# Patient Record
Sex: FEMALE | Race: ASIAN | NOT HISPANIC OR LATINO | URBAN - NONMETROPOLITAN AREA
[De-identification: names, ages, dates, MRNs, and addresses within clinical notes are randomized per-mention and may not be internally consistent; named-entity substitution may affect disease eponyms.]

---

## 2018-01-18 ENCOUNTER — CONVERSION ENCOUNTER (OUTPATIENT)
Dept: FAMILY MEDICINE CLINIC | Age: 83
End: 2018-01-18

## 2018-02-01 ENCOUNTER — CONVERSION ENCOUNTER (OUTPATIENT)
Dept: FAMILY MEDICINE CLINIC | Age: 83
End: 2018-02-01

## 2018-02-22 ENCOUNTER — CONVERSION ENCOUNTER (OUTPATIENT)
Dept: FAMILY MEDICINE CLINIC | Age: 83
End: 2018-02-22

## 2018-03-27 ENCOUNTER — CONVERSION ENCOUNTER (OUTPATIENT)
Dept: FAMILY MEDICINE CLINIC | Age: 83
End: 2018-03-27

## 2018-04-05 ENCOUNTER — CONVERSION ENCOUNTER (OUTPATIENT)
Dept: FAMILY MEDICINE CLINIC | Age: 83
End: 2018-04-05

## 2018-04-17 ENCOUNTER — CONVERSION ENCOUNTER (OUTPATIENT)
Dept: FAMILY MEDICINE CLINIC | Age: 83
End: 2018-04-17

## 2018-05-03 ENCOUNTER — CONVERSION ENCOUNTER (OUTPATIENT)
Dept: FAMILY MEDICINE CLINIC | Age: 83
End: 2018-05-03

## 2018-05-25 ENCOUNTER — CONVERSION ENCOUNTER (OUTPATIENT)
Dept: FAMILY MEDICINE CLINIC | Age: 83
End: 2018-05-25

## 2018-07-30 ENCOUNTER — CONVERSION ENCOUNTER (OUTPATIENT)
Dept: FAMILY MEDICINE CLINIC | Age: 83
End: 2018-07-30

## 2018-08-20 ENCOUNTER — OFFICE VISIT CONVERTED (OUTPATIENT)
Dept: FAMILY MEDICINE CLINIC | Age: 83
End: 2018-08-20
Attending: FAMILY MEDICINE

## 2018-08-27 ENCOUNTER — CONVERSION ENCOUNTER (OUTPATIENT)
Dept: FAMILY MEDICINE CLINIC | Age: 83
End: 2018-08-27

## 2018-09-27 ENCOUNTER — CONVERSION ENCOUNTER (OUTPATIENT)
Dept: FAMILY MEDICINE CLINIC | Age: 83
End: 2018-09-27

## 2018-10-31 ENCOUNTER — OFFICE VISIT CONVERTED (OUTPATIENT)
Dept: FAMILY MEDICINE CLINIC | Age: 83
End: 2018-10-31
Attending: NURSE PRACTITIONER

## 2018-11-13 ENCOUNTER — CONVERSION ENCOUNTER (OUTPATIENT)
Dept: FAMILY MEDICINE CLINIC | Age: 83
End: 2018-11-13

## 2018-11-30 ENCOUNTER — OFFICE VISIT CONVERTED (OUTPATIENT)
Dept: FAMILY MEDICINE CLINIC | Age: 83
End: 2018-11-30
Attending: FAMILY MEDICINE

## 2018-12-11 ENCOUNTER — OFFICE VISIT CONVERTED (OUTPATIENT)
Dept: FAMILY MEDICINE CLINIC | Age: 83
End: 2018-12-11
Attending: NURSE PRACTITIONER

## 2018-12-14 ENCOUNTER — CONVERSION ENCOUNTER (OUTPATIENT)
Dept: FAMILY MEDICINE CLINIC | Age: 83
End: 2018-12-14

## 2018-12-18 ENCOUNTER — OFFICE VISIT CONVERTED (OUTPATIENT)
Dept: FAMILY MEDICINE CLINIC | Age: 83
End: 2018-12-18
Attending: NURSE PRACTITIONER

## 2019-01-07 ENCOUNTER — OFFICE VISIT CONVERTED (OUTPATIENT)
Dept: FAMILY MEDICINE CLINIC | Age: 84
End: 2019-01-07
Attending: FAMILY MEDICINE

## 2019-01-14 ENCOUNTER — CONVERSION ENCOUNTER (OUTPATIENT)
Dept: FAMILY MEDICINE CLINIC | Age: 84
End: 2019-01-14

## 2019-02-22 ENCOUNTER — CONVERSION ENCOUNTER (OUTPATIENT)
Dept: FAMILY MEDICINE CLINIC | Age: 84
End: 2019-02-22

## 2019-02-28 ENCOUNTER — CONVERSION ENCOUNTER (OUTPATIENT)
Dept: FAMILY MEDICINE CLINIC | Age: 84
End: 2019-02-28

## 2019-03-14 ENCOUNTER — CONVERSION ENCOUNTER (OUTPATIENT)
Dept: FAMILY MEDICINE CLINIC | Age: 84
End: 2019-03-14

## 2019-03-26 ENCOUNTER — OFFICE VISIT CONVERTED (OUTPATIENT)
Dept: FAMILY MEDICINE CLINIC | Age: 84
End: 2019-03-26
Attending: FAMILY MEDICINE

## 2019-04-15 ENCOUNTER — CONVERSION ENCOUNTER (OUTPATIENT)
Dept: FAMILY MEDICINE CLINIC | Age: 84
End: 2019-04-15

## 2019-04-23 ENCOUNTER — CONVERSION ENCOUNTER (OUTPATIENT)
Dept: FAMILY MEDICINE CLINIC | Age: 84
End: 2019-04-23

## 2019-05-09 ENCOUNTER — CONVERSION ENCOUNTER (OUTPATIENT)
Dept: FAMILY MEDICINE CLINIC | Age: 84
End: 2019-05-09

## 2019-06-05 ENCOUNTER — CONVERSION ENCOUNTER (OUTPATIENT)
Dept: FAMILY MEDICINE CLINIC | Age: 84
End: 2019-06-05

## 2019-07-31 ENCOUNTER — OFFICE VISIT CONVERTED (OUTPATIENT)
Dept: FAMILY MEDICINE CLINIC | Age: 84
End: 2019-07-31
Attending: FAMILY MEDICINE

## 2019-08-30 ENCOUNTER — CONVERSION ENCOUNTER (OUTPATIENT)
Dept: FAMILY MEDICINE CLINIC | Age: 84
End: 2019-08-30

## 2019-09-13 ENCOUNTER — CONVERSION ENCOUNTER (OUTPATIENT)
Dept: FAMILY MEDICINE CLINIC | Age: 84
End: 2019-09-13

## 2019-11-18 ENCOUNTER — CONVERSION ENCOUNTER (OUTPATIENT)
Dept: FAMILY MEDICINE CLINIC | Age: 84
End: 2019-11-18

## 2020-01-21 ENCOUNTER — CONVERSION ENCOUNTER (OUTPATIENT)
Dept: FAMILY MEDICINE CLINIC | Age: 85
End: 2020-01-21

## 2020-01-27 ENCOUNTER — OFFICE VISIT CONVERTED (OUTPATIENT)
Dept: FAMILY MEDICINE CLINIC | Age: 85
End: 2020-01-27
Attending: FAMILY MEDICINE

## 2020-01-27 ENCOUNTER — HOSPITAL ENCOUNTER (OUTPATIENT)
Dept: OTHER | Facility: HOSPITAL | Age: 85
Discharge: HOME | End: 2020-01-27
Attending: FAMILY MEDICINE

## 2020-01-27 LAB
25(OH)D3 SERPL-MCNC: 35.8 NG/ML (ref 30–100)
ALBUMIN SERPL-MCNC: 3.4 G/DL (ref 3.5–5)
ALBUMIN/GLOB SERPL: 1 {RATIO} (ref 1.4–2.6)
ALP SERPL-CCNC: 146 U/L (ref 43–160)
ALT SERPL-CCNC: 17 U/L (ref 10–40)
ANION GAP SERPL CALC-SCNC: 15 MMOL/L (ref 8–19)
AST SERPL-CCNC: 24 U/L (ref 15–50)
BILIRUB SERPL-MCNC: 0.33 MG/DL (ref 0.2–1.3)
BUN SERPL-MCNC: 20 MG/DL (ref 5–25)
BUN/CREAT SERPL: 27 {RATIO} (ref 6–20)
CALCIUM SERPL-MCNC: 9.5 MG/DL (ref 8.7–10.4)
CHLORIDE SERPL-SCNC: 104 MMOL/L (ref 99–111)
CHOLEST SERPL-MCNC: 120 MG/DL (ref 107–200)
CHOLEST/HDLC SERPL: 2.6 {RATIO} (ref 3–6)
CONV CO2: 26 MMOL/L (ref 22–32)
CONV TOTAL PROTEIN: 6.8 G/DL (ref 6.3–8.2)
CREAT UR-MCNC: 0.73 MG/DL (ref 0.5–0.9)
ERYTHROCYTE [DISTWIDTH] IN BLOOD BY AUTOMATED COUNT: 17.8 % (ref 11.5–14.5)
GFR SERPLBLD BASED ON 1.73 SQ M-ARVRAT: >60 ML/MIN/{1.73_M2}
GLOBULIN UR ELPH-MCNC: 3.4 G/DL (ref 2–3.5)
GLUCOSE SERPL-MCNC: 77 MG/DL (ref 65–99)
HBA1C MFR BLD: 10.9 G/DL (ref 12–16)
HCT VFR BLD AUTO: 36.1 % (ref 37–47)
HDLC SERPL-MCNC: 46 MG/DL (ref 40–60)
LDLC SERPL CALC-MCNC: 54 MG/DL (ref 70–100)
MCH RBC QN AUTO: 23.9 PG (ref 27–31)
MCHC RBC AUTO-ENTMCNC: 30.2 G/DL (ref 33–37)
MCV RBC AUTO: 79.2 FL (ref 81–99)
OSMOLALITY SERPL CALC.SUM OF ELEC: 291 MOSM/KG (ref 273–304)
PLATELET # BLD AUTO: 290 10*3/UL (ref 130–400)
PMV BLD AUTO: 9.8 FL (ref 7.4–10.4)
POTASSIUM SERPL-SCNC: 4.9 MMOL/L (ref 3.5–5.3)
RBC # BLD AUTO: 4.56 10*6/UL (ref 4.2–5.4)
SODIUM SERPL-SCNC: 140 MMOL/L (ref 135–147)
TRIGL SERPL-MCNC: 101 MG/DL (ref 40–150)
TSH SERPL-ACNC: 2.34 M[IU]/L (ref 0.27–4.2)
VLDLC SERPL-MCNC: 20 MG/DL (ref 5–37)
WBC # BLD AUTO: 6.83 10*3/UL (ref 4.8–10.8)

## 2020-01-31 ENCOUNTER — CONVERSION ENCOUNTER (OUTPATIENT)
Dept: FAMILY MEDICINE CLINIC | Age: 85
End: 2020-01-31

## 2020-02-14 ENCOUNTER — CONVERSION ENCOUNTER (OUTPATIENT)
Dept: FAMILY MEDICINE CLINIC | Age: 85
End: 2020-02-14

## 2020-02-24 ENCOUNTER — OFFICE VISIT CONVERTED (OUTPATIENT)
Dept: FAMILY MEDICINE CLINIC | Age: 85
End: 2020-02-24
Attending: FAMILY MEDICINE

## 2020-02-24 ENCOUNTER — HOSPITAL ENCOUNTER (OUTPATIENT)
Dept: OTHER | Facility: HOSPITAL | Age: 85
Discharge: HOME | End: 2020-02-24
Attending: FAMILY MEDICINE

## 2020-02-24 LAB
APPEARANCE UR: ABNORMAL
BACTERIA UR CULT: NORMAL
BACTERIA UR QL AUTO: ABNORMAL
BILIRUB UR QL: NEGATIVE
CASTS URNS QL MICRO: ABNORMAL /[LPF]
COLOR UR: YELLOW
CONV LEUKOCYTE ESTERASE: ABNORMAL
CONV UROBILINOGEN IN URINE BY AUTOMATED TEST STRIP: 0.2 {EHRLICHU}/DL (ref 0.1–1)
EPI CELLS #/AREA URNS HPF: ABNORMAL /[HPF]
GLUCOSE 24H UR-MCNC: NEGATIVE MG/DL
HGB UR QL STRIP: ABNORMAL
KETONES UR QL STRIP: NEGATIVE MG/DL
MUCOUS THREADS URNS QL MICRO: ABNORMAL
NITRITE UR-MCNC: POSITIVE MG/ML
PH UR STRIP.AUTO: 6 [PH] (ref 5–8)
PROT UR-MCNC: NEGATIVE MG/DL
RBC # BLD AUTO: ABNORMAL /[HPF]
SP GR UR STRIP: >=1.03 (ref 1–1.03)
SPECIMEN SOURCE: ABNORMAL
UNIDENT CRYS URNS QL MICRO: ABNORMAL /[HPF]
WBC #/AREA URNS HPF: ABNORMAL /[HPF]

## 2020-02-26 LAB
AMOXICILLIN+CLAV SUSC ISLT: 4
AMPICILLIN SUSC ISLT: >=32
AMPICILLIN+SULBAC SUSC ISLT: 4
BACTERIA UR CULT: ABNORMAL
CEFAZOLIN SUSC ISLT: <=4
CEFEPIME SUSC ISLT: <=1
CEFTAZIDIME SUSC ISLT: <=1
CEFTRIAXONE SUSC ISLT: <=1
CEFUROXIME ORAL SUSC ISLT: 4
CEFUROXIME PARENTER SUSC ISLT: 4
CIPROFLOXACIN SUSC ISLT: <=0.25
ERTAPENEM SUSC ISLT: <=0.5
GENTAMICIN SUSC ISLT: <=1
LEVOFLOXACIN SUSC ISLT: <=0.12
NITROFURANTOIN SUSC ISLT: <=16
TETRACYCLINE SUSC ISLT: >=16
TMP SMX SUSC ISLT: >=320
TOBRAMYCIN SUSC ISLT: <=1

## 2020-03-03 ENCOUNTER — CONVERSION ENCOUNTER (OUTPATIENT)
Dept: FAMILY MEDICINE CLINIC | Age: 85
End: 2020-03-03

## 2020-03-06 ENCOUNTER — OFFICE VISIT CONVERTED (OUTPATIENT)
Dept: FAMILY MEDICINE CLINIC | Age: 85
End: 2020-03-06
Attending: FAMILY MEDICINE

## 2020-03-31 ENCOUNTER — CONVERSION ENCOUNTER (OUTPATIENT)
Dept: FAMILY MEDICINE CLINIC | Age: 85
End: 2020-03-31

## 2020-04-28 ENCOUNTER — CONVERSION ENCOUNTER (OUTPATIENT)
Dept: FAMILY MEDICINE CLINIC | Age: 85
End: 2020-04-28

## 2020-05-04 ENCOUNTER — HOSPITAL ENCOUNTER (OUTPATIENT)
Dept: OTHER | Facility: HOSPITAL | Age: 85
Discharge: HOME | End: 2020-05-04
Attending: FAMILY MEDICINE

## 2020-05-04 LAB
APPEARANCE UR: ABNORMAL
BACTERIA UR QL AUTO: ABNORMAL
BILIRUB UR QL: NEGATIVE
CASTS URNS QL MICRO: ABNORMAL /[LPF]
COLOR UR: YELLOW
CONV LEUKOCYTE ESTERASE: NEGATIVE
CONV UROBILINOGEN IN URINE BY AUTOMATED TEST STRIP: 0.2 {EHRLICHU}/DL (ref 0.1–1)
EPI CELLS #/AREA URNS HPF: ABNORMAL /[HPF]
GLUCOSE 24H UR-MCNC: NEGATIVE MG/DL
HGB UR QL STRIP: ABNORMAL
KETONES UR QL STRIP: NEGATIVE MG/DL
MUCOUS THREADS URNS QL MICRO: ABNORMAL
NITRITE UR-MCNC: NEGATIVE MG/ML
PH UR STRIP.AUTO: 6.5 [PH] (ref 5–8)
PROT UR-MCNC: NEGATIVE MG/DL
RBC # BLD AUTO: ABNORMAL /[HPF]
SP GR UR STRIP: 1.02 (ref 1–1.03)
SPECIMEN SOURCE: ABNORMAL
UNIDENT CRYS URNS QL MICRO: ABNORMAL /[HPF]
WBC #/AREA URNS HPF: ABNORMAL /[HPF]

## 2020-08-11 ENCOUNTER — CONVERSION ENCOUNTER (OUTPATIENT)
Dept: FAMILY MEDICINE CLINIC | Age: 85
End: 2020-08-11

## 2020-10-09 ENCOUNTER — CONVERSION ENCOUNTER (OUTPATIENT)
Dept: FAMILY MEDICINE CLINIC | Age: 85
End: 2020-10-09

## 2020-11-03 ENCOUNTER — CONVERSION ENCOUNTER (OUTPATIENT)
Dept: FAMILY MEDICINE CLINIC | Age: 85
End: 2020-11-03

## 2020-12-22 ENCOUNTER — OFFICE VISIT CONVERTED (OUTPATIENT)
Dept: FAMILY MEDICINE CLINIC | Age: 85
End: 2020-12-22
Attending: FAMILY MEDICINE

## 2021-02-04 ENCOUNTER — CONVERSION ENCOUNTER (OUTPATIENT)
Dept: FAMILY MEDICINE CLINIC | Age: 86
End: 2021-02-04

## 2021-02-24 ENCOUNTER — HOSPITAL ENCOUNTER (OUTPATIENT)
Dept: VACCINE CLINIC | Facility: HOSPITAL | Age: 86
Discharge: HOME | End: 2021-02-24
Attending: INTERNAL MEDICINE

## 2021-03-17 ENCOUNTER — CONVERSION ENCOUNTER (OUTPATIENT)
Dept: FAMILY MEDICINE CLINIC | Age: 86
End: 2021-03-17

## 2021-03-24 ENCOUNTER — CONVERSION ENCOUNTER (OUTPATIENT)
Dept: FAMILY MEDICINE CLINIC | Age: 86
End: 2021-03-24

## 2021-03-25 ENCOUNTER — HOSPITAL ENCOUNTER (OUTPATIENT)
Dept: VACCINE CLINIC | Facility: HOSPITAL | Age: 86
Discharge: HOME | End: 2021-03-25
Attending: INTERNAL MEDICINE

## 2021-03-31 ENCOUNTER — CONVERSION ENCOUNTER (OUTPATIENT)
Dept: FAMILY MEDICINE CLINIC | Age: 86
End: 2021-03-31

## 2021-04-21 ENCOUNTER — CONVERSION ENCOUNTER (OUTPATIENT)
Dept: FAMILY MEDICINE CLINIC | Age: 86
End: 2021-04-21

## 2021-05-03 ENCOUNTER — CONVERSION ENCOUNTER (OUTPATIENT)
Dept: FAMILY MEDICINE CLINIC | Age: 86
End: 2021-05-03

## 2021-05-18 NOTE — PROGRESS NOTES
Ping Vance 1935     Office/Outpatient Visit    Visit Date: Fri, Nov 30, 2018 02:27 pm    Provider: Ricci Means MD (Assistant: Bruna Romano MA)    Location: [facility]        Electronically signed by Ricci Means MD on  12/03/2018 03:05:59 PM                             SUBJECTIVE:        CC: We are using a  phone to communicate with her. She is accompanied into the exam room by her daughter in law today ./mlb     Ms. Jose Wynn is a 83 year old female.  Patient is here for routine check up.;         HPI:         Patient presents with hTN.  Her current cardiac medication regimen includes an ACE inhibitor.  Compliance with treatment has been good.          Ms. Jose Wynn presents with old CVA..  The symptom began several months ago.  The severity is of moderate intensity.  SSTILL TAKING COUMADIN.  STILL SOME ISSUES WITH SPEECH.  SHE SAW NEUROLOGY WHO ADVISED HER TO CONTINUE THE COUMADIN LONG-TERM     ROS:     CONSTITUTIONAL:  Positive for fatigue.      E/N/T:  Negative for ear pain, nasal congestion and sore throat.      CARDIOVASCULAR:  Negative for chest pain and pedal edema.      RESPIRATORY:  Negative for recent cough and dyspnea.      GASTROINTESTINAL:  Negative for abdominal pain, nausea and vomiting.      MUSCULOSKELETAL:  Negative for myalgias.      NEUROLOGICAL:  Negative for headaches.          PMH/FMH/SH:     Last Reviewed on 12/03/2018 03:02 PM by Ricci Means    Past Medical History:                 PAST MEDICAL HISTORY     Hospitalizations:    Pneumonia last admit date 1-8-16         CURRENT MEDICAL PROVIDERS:    Pulmonologist         PREVENTIVE HEALTH MAINTENANCE             BONE DENSITY: but the results are unknown     COLORECTAL CANCER SCREENING: but the colonoscopy results are unknown     EYE EXAM: was last done 2/2018 Dr. Gomez     MAMMOGRAM: but the results are unknown         PAST MEDICAL HISTORY                 ADVANCED  DIRECTIVES: None         Surgical History:         Cataract Removal: bilateral;         Social History:     Occupation:    Retired. Previous ;     Marital Status:      Children: 5 children         Tobacco/Alcohol/Supplements:     Last Reviewed on 12/03/2018 03:02 PM by Ricci Means    Tobacco: She has never smoked.          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, dysphasia     Old CVA     High cholesterol     GERD     Vitamin D deficiency, unspecified     HTN     Generalized osteoarthritis, multiple sites     COPD     Anticoagulation followup         Immunizations:     Fluzone High-Dose pf (>=65 yr) 12/13/2016     Fluzone High-Dose pf (>=65 yr) 1/8/2018     Fluzone High-Dose pf (>=65 yr) 9/27/2018         Allergies:     Last Reviewed on 12/03/2018 03:02 PM by Ricci Means    Naproxen:        Current Medications:     Last Reviewed on 12/03/2018 03:02 PM by Ricci Means    Atorvastatin Calcium 80mg Tablet 1 tab hs     Lisinopril 10mg Tablet 1 tab in moring daily     Montelukast Sodium 10mg Tablet 1 tab daily     Pantoprazole 40mg Tablets, Delayed Release 1 po q day     Warfarin Sodium 2.5mg Tablet 1 tab daily or as directed     Warfarin Sodium 3mg Tablet 1 tab daily     Vitamin D3 5,000IU Capsules 1 capsule every day     ProAir HFA 90mcg/1actuation Oral Inhaler 1-2 puffs inhalation every 4-6 hours for shortness of breath, chest tightness, and wheezing.         OBJECTIVE:         Vitals:         Current: 11/30/2018 2:32:41 PM    Ht:  5 ft, 2 in;  Wt: 217.8 lbs;  BMI: 39.8    T: 97.6 F (oral);  BP: 125/46 mm Hg (left arm, sitting);  P: 80 bpm (left arm (BP Cuff), sitting);  sCr: 0.71 mg/dL;  GFR: 69.13        Exams:     PHYSICAL EXAM:     GENERAL: vital signs recorded - well developed, well nourished;  no apparent distress;     NECK: trachea is midline; thyroid is non-palpable;     RESPIRATORY: normal respiratory rate and pattern with no distress; normal breath sounds with no rales, rhonchi, wheezes or rubs;     CARDIOVASCULAR: normal rate; rhythm is regular;  no edema;     LYMPHATIC: no enlargement of cervical or facial nodes;     NEUROLOGIC: GROSSLY INTACT     PSYCHIATRIC: appropriate affect and demeanor;         ASSESSMENT           401.1   I10  HTN              DDx:     438.9   I69.30  Old CVA              DDx:         PLAN:          HTN         MEDICATIONS: (no change to current medication regimen)     FOLLOW-UP: Schedule a follow-up visit in 5 months. LABS IN THE SPRING          Old CVA         MEDICATIONS: (no change to current medication regimen)     PER NEUROLOGY             Patient Recommendations:        For  HTN:     Schedule a follow-up visit in 5 months.          For  Old CVA:     I also recommend PER NEUROLOGY.              CHARGE CAPTURE           **Please note: ICD descriptions below are intended for billing purposes only and may not represent clinical diagnoses**        Primary Diagnosis:         401.1 HTN            I10    Essential (primary) hypertension              Orders:          68721   Office/outpatient visit; established patient, level 4  (In-House)           438.9 Old CVA            I69.30    Unspecified sequelae of cerebral infarction

## 2021-05-18 NOTE — PROGRESS NOTES
Ping Vance  1935     Office/Outpatient Visit    Visit Date: Mon, Jan 27, 2020 09:09 am    Provider: Ricci Means MD (Assistant: Aleja Rico MA)    Location: [facility]        Electronically signed by Ricci Means MD on  01/27/2020 04:37:30 PM                             Subjective:        CC: PT STATES SHE ISNT TAKING MONTELUKAST     HPI:           Ms. Jose Wynn is here for a Medicare wellness visit.  The required HRA questions are integrated within this visit note. Family medical history and individual medical/surgical history were reviewed and updated.  A current height, weight, BMI, blood pressure, and pulse were recorded in the vitals section of the note and have been reviewed. Patient's medications, including supplements, were recorded in the chart and reviewed.  Current providers and suppliers were reviewed and updated.          Self-Assessment of Health: She rates her health as fair. She rates her confidence of being able to control/manage most of her health problems as not very confident. Her physical/emotional health has limited her social activites moderately.  A review of possible cognitive impairment was performed and the following was noted: Memory impairment screen is abnormal.  A review of functional ability and level of safety was performed and the following was noted: Bathing ( Performs with assistance ); Dressing: ( Performs with assistance ); Eating: ( performs independently ); Toilet use: ( Performs with assistance ); Transferring: ( Performs with assistance ) Falls Risk: Has not had any falls or only one fall without injury in the past year.  She denies having trouble hearing the TV/radio when others do not, having to strain to hear or understand conversations and wearing hearing aid(s).  Concerning home safety, she reports that at home she DOES have adequate lighting, a skid resistant shower/tub, handrails on stairs, functioning smoke alarms  and absence of throw rugs, but not grab bars in the bath.          Immunization Status: Up to date; Physical Activity: She never excercises.; Type of diet patient normally eats is described as well-balanced with fruits and vegetables Tobacco: She has never smoked.  Preventative Health updated today           PHQ-9 Depression Screening: Completed form scanned and in chart; Total Score 20     ROS:     CONSTITUTIONAL:  Positive for fatigue.   Negative for chills or fever.      EYES:  Negative for eye drainage.      E/N/T:  Negative for ear pain, nasal congestion and sore throat.      CARDIOVASCULAR:  Positive for pedal edema ( mild ).   Negative for chest pain.      RESPIRATORY:  Positive for recent cough ( typically dry ).   Negative for dyspnea.      GASTROINTESTINAL:  Positive for anorexia ( loss of appetite; MILD ) and heartburn.   Negative for abdominal pain, constipation, diarrhea, nausea or vomiting.      GENITOURINARY:  Negative for dysuria and hematuria.      MUSCULOSKELETAL:  Negative for myalgias.      NEUROLOGICAL:  Negative for headaches.      ALLERGIC/IMMUNOLOGIC:  Negative for seasonal allergies.      PSYCHIATRIC:  Positive for depression and insomnia; hypersomnia; SLEEPS DURING THE DAY, UP AT NIGHT.   Negative for suicidal thoughts.          Past Medical History / Family History / Social History:         Last Reviewed on 1/27/2020 09:25 AM by Ricci Means    Past Medical History:                 PAST MEDICAL HISTORY     Hospitalizations:    Pneumonia         CURRENT MEDICAL PROVIDERS:    Pulmonologist         PREVENTIVE HEALTH MAINTENANCE             BONE DENSITY: was last done IN THE PAST but the results are unknown     COLORECTAL CANCER SCREENING: Up to date (colonoscopy q10y; sigmoidoscopy q5y; Cologuard q3y) was last done IN THE PAST; NORMAL IN THE PAST, RESULTS NOT IN CHART, NO LONGER INDICATED     EYE EXAM: was last done 2/2018     Hepatitis C Medicare Screening: Not indicated      MAMMOGRAM: was last done IN THE PAST but the results are unknown no longer indicated     PAP SMEAR: No longer indicated due to age and history         PAST MEDICAL HISTORY                 ADVANCED DIRECTIVES: None         Surgical History:         Appendectomy    Joint Replacement: LEFT TKR;;;     Cataract Removal: bilateral;     Hysterectomy    COLOSTOMY AND REVERSAL;;;         Social History:     Occupation:    Retired. Previous ;     Marital Status:      Children: 5 children         Tobacco/Alcohol/Supplements:     Last Reviewed on 1/27/2020 09:25 AM by Ricci Means    Tobacco: She has never smoked.          Substance Abuse History:     Last Reviewed on 12/18/2018 11:57 AM by Gem Baldwin        Mental Health History:     Last Reviewed on 12/18/2018 11:57 AM by Gem Baldwin        Communicable Diseases (eg STDs):     Last Reviewed on 12/18/2018 11:57 AM by Gem Baldwin        Current Problems:     Last Reviewed on 1/27/2020 09:25 AM by Ricci Means    Generalized osteoarthritis, multiple sites    Chronic obstructive pulmonary disease, unspecified    Primary generalized (osteo)arthritis    COPD    Vitamin D deficiency, unspecified    Vitamin D deficiency, unspecified    GERD    Gastro-esophageal reflux disease without esophagitis    Weakness    Speech and 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, dysphasia    Unspecified sequelae of cerebral infarction    Dysphasia following unspecified cerebrovascular disease    Pure hypercholesterolemia, unspecified    Generalized weakness    High cholesterol    Old CVA    Other chronic allergic conjunctivitis    Chronic allergic conjunctivitis    Long term (current) use of anticoagulants    Anticoagulation followup    Aortic arch thrombus (Neurology)    Localized edema    Pedal edema    Encounter for general adult medical examination without abnormal findings        Immunizations:     Fluzone High-Dose pf (>=65 yr)  12/13/2016    Fluzone High-Dose pf (>=65 yr) 1/8/2018    Fluzone High-Dose pf (>=65 yr) 9/27/2018    Influenza, high dose seasonal 1/27/2020        Allergies:     Last Reviewed on 1/27/2020 09:25 AM by Ricci Means    Naproxen:          Current Medications:     Last Reviewed on 1/27/2020 09:25 AM by Ricci Means    cholecalciferol (vitamin D3) 5,000 unit oral capsule [1 capsule every day]    Warfarin Sodium 3mg Tablet [1 tab daily]    atorvastatin 80 mg oral tablet [1 tab hs]    Pantoprazole 40 mg oral tablet, delayed release (enteric coated) [1 po q day]    montelukast 10 mg oral tablet [TAKE 1 TABLET BY MOUTH EVERY DAY]    ProAir HFA 90mcg/1actuation Oral Inhaler [1-2 puffs inhalation every 4-6 hours for shortness of breath, chest tightness, and wheezing.]        Objective:        Vitals:         Current: 1/27/2020 9:24:37 AM    Ht:  5 ft, 2 in;  Wt: 208 lbs;  BMI: 38.0T: 98.3 F (oral);  BP: 143/58 mm Hg (left arm, sitting);  P: 67 bpm (left arm (BP Cuff), sitting);  sCr: 0.71 mg/dL;  GFR: 66.65VA: 20/40 OD, 20/50 OS (without correction)        Exams:     PHYSICAL EXAM:     GENERAL: vital signs recorded - well developed, well nourished;  no apparent distress;     EYES: extraocular movements intact;     E/N/T:  normal EACs, TMs, nasal/oral mucosa, teeth, gingiva, and oropharynx;     NECK: trachea is midline; thyroid is non-palpable; carotid exam reveals no bruits;     RESPIRATORY: normal respiratory rate and pattern with no distress; normal breath sounds with no rales, rhonchi, wheezes or rubs;     CARDIOVASCULAR: normal rate; rhythm is regular;  trace pedal edema;     GASTROINTESTINAL: nontender;     LYMPHATIC: no enlargement of cervical or facial nodes; no supraclavicular nodes;     MUSCULOSKELETAL: gait: uses a cane;  normal overall tone     NEUROLOGIC: GROSSLY INTACT     PSYCHIATRIC: appropriate affect and demeanor;         Assessment:         Z00.00   Encounter for general adult medical  examination without abnormal findings       E55.9   Vitamin D deficiency, unspecified       R53.1   Weakness       E78.00   Pure hypercholesterolemia, unspecified       F34.1   Dysthymic disorder       Z13.31   Encounter for screening for depression           ORDERS:         Lab Orders:       [id number]  VITD - [facility] Vitamin D, 25 Hydroxy  (Send-Out)            17870  BDCB2 - [facility] CBC w/o diff  (Send-Out)            84328  COMP - [facility] Comp. Metabolic Panel  (Send-Out)            89538  TSH - [facility] TSH  (Send-Out)            60037  LPDP - [facility] Lipid Panel  (Send-Out)              Other Orders:         Depression screen positive and follow up plan documented  (In-House)            1101F  Pt screen for fall risk; document no falls in past year or only 1 fall w/o injury in past year (DEBI)  (In-House)                      Plan:         Encounter for general adult medical examination without abnormal findings        COUNSELING provided on: fall prevention, healthy eating habits, and Medicare Preventative Services Guide given to patient..      FOLLOW-UP: Schedule a follow-up visit in 1 month.  MIPS Positive Depression Screen: Suicide Risk Assessment completed--denies suicidal/homicidal ideation           Orders:         Depression screen positive and follow up plan documented  (In-House)            1101F  Pt screen for fall risk; document no falls in past year or only 1 fall w/o injury in past year (DEBI)  (In-House)              Vitamin D deficiency, unspecified    LABORATORY:  Labs ordered to be performed today include Vitamin D.            Orders:       59628  VITD - [facility] Vitamin D, 25 Hydroxy  (Send-Out)              Weakness    LABORATORY:  Labs ordered to be performed today include CBC W/O DIFF, Comprehensive metabolic panel, and TSH.            Orders:       43779  BDCB2 - [facility] CBC w/o diff  (Send-Out)            68036  COMP - [facility] Comp. Metabolic Panel  (Send-Out)            18704  TSH - H TSH  (Send-Out)               Pure hypercholesterolemia, unspecified    LABORATORY:  Labs ordered to be performed today include lipid panel.            Orders:       [id number]  [facility] Lipid Panel  (Send-Out)              Dysthymic disorder        WILL AWAIT LABS AND PROBABLY START MEDS AT F/U VISIT             Patient Recommendations:        For  Encounter for general adult medical examination without abnormal findings:        Regularly exercise within recommended guidelines, especially to maintain balance. Remove obstacles in walkways at home.  Use non-skid material for bathtub safety.  Remove loose throw rugs from floor. Use nightlights in bedrooms, hallways, and bathrooms.    Limit dietary intake of fat (especially saturated fat) and cholesterol.  Eat a variety of foods, including plenty of fruits, vegetables, and grain containg fiber, limit fat intake to 30% of total calories. Balance caloric intake with energy expended.  Schedule a follow-up visit in 1 month.          For  Dysthymic disorder:    I also recommend WILL AWAIT LABS AND PROBABLY START MEDS AT F/U VISIT.              Charge Capture:         Primary Diagnosis:     Z00.00  Encounter for general adult medical examination without abnormal findings           Orders:        Depression screen positive and follow up plan documented  (In-House)            1101F  Pt screen for fall risk; document no falls in past year or only 1 fall w/o injury in past year (DEBI)  (In-House)              E55.9  Vitamin D deficiency, unspecified     R53.1  Weakness     E78.00  Pure hypercholesterolemia, unspecified     F34.1  Dysthymic disorder     Z13.31  Encounter for screening for depression         ADDENDUMS:      ____________________________________    Addendum: 01/29/2020 08:38 ISMAEL - Ricci Means        ADD

## 2021-05-18 NOTE — PROGRESS NOTES
Ping Vance  1935     Office/Outpatient Visit    Visit Date: Fri, Mar 6, 2020 12:21 pm    Provider: Ricci Means MD (Assistant: Bessie Nuñez MA)    Location: [facility]        Electronically signed by Ricci Means MD on  03/06/2020 02:41:27 PM                             Subjective:        CC: Ms. Jose Wynn is a 84 year old female.  presents today due to EYE REDNESS         HPI:           Complaint of conjunctival hyperemia, bilateral..  The symptom began yesterday.  The severity is of moderate intensity.      ROS:     CONSTITUTIONAL:  Negative for chills and fever.      EYES:  Positive for eye drainage.   Negative for blurred vision, eye pain or itching.      E/N/T:  Negative for ear pain, nasal congestion and sore throat.      GASTROINTESTINAL:  Negative for abdominal pain, nausea and vomiting.      MUSCULOSKELETAL:  Negative for myalgias.      NEUROLOGICAL:  Negative for headaches.          Past Medical History / Family History / Social History:         Last Reviewed on 3/06/2020 12:26 PM by Ricci Means    Past Medical History:                 PAST MEDICAL HISTORY     Hospitalizations:    Pneumonia         CURRENT MEDICAL PROVIDERS:    Neurologist    Pulmonologist         PREVENTIVE HEALTH MAINTENANCE             BONE DENSITY: was last done IN THE PAST but the results are unknown     COLORECTAL CANCER SCREENING: Up to date (colonoscopy q10y; sigmoidoscopy q5y; Cologuard q3y) was last done IN THE PAST; NORMAL IN THE PAST, RESULTS NOT IN CHART, NO LONGER INDICATED     EYE EXAM: was last done 2/2018     Hepatitis C Medicare Screening: Not indicated     MAMMOGRAM: was last done IN THE PAST but the results are unknown no longer indicated     PAP SMEAR: No longer indicated due to age and history         PAST MEDICAL HISTORY                 ADVANCED DIRECTIVES: None         Surgical History:         Appendectomy    Joint Replacement: LEFT TKR;;;      Cataract Removal: bilateral;     Hysterectomy    COLOSTOMY AND REVERSAL;;;         Social History:     Occupation:    Retired. Previous ;     Marital Status:      Children: 5 children         Tobacco/Alcohol/Supplements:     Last Reviewed on 3/06/2020 12:26 PM by Bessie Nuñez    Tobacco: She has never smoked.          Substance Abuse History:     Last Reviewed on 12/18/2018 11:57 AM by Gem Baldwin        Mental Health History:     Last Reviewed on 12/18/2018 11:57 AM by Gem Baldwin        Communicable Diseases (eg STDs):     Last Reviewed on 12/18/2018 11:57 AM by Gem Baldwin        Current Problems:     Last Reviewed on 3/06/2020 12:26 PM by Ricci Means    Chronic obstructive pulmonary disease, unspecified    Primary generalized (osteo)arthritis    Vitamin D deficiency, unspecified    Gastro-esophageal reflux disease without esophagitis    Pure hypercholesterolemia, unspecified    Unspecified sequelae of cerebral infarction    Other chronic allergic conjunctivitis    Long term (current) use of anticoagulants    Aortic arch thrombus (Neurology)    Localized edema (pedal)    Dysthymic disorder    Functional urinary incontinence        Immunizations:     Influenza, high dose seasonal 1/27/2020    Fluzone High-Dose pf (>=65 yr) 12/13/2016    Fluzone High-Dose pf (>=65 yr) 1/8/2018    Fluzone High-Dose pf (>=65 yr) 9/27/2018        Allergies:     Last Reviewed on 3/06/2020 12:26 PM by Bessie Nuñez    Naproxen:          Current Medications:     Last Reviewed on 3/06/2020 12:26 PM by Bessie Nuñez    cholecalciferol (vitamin D3) 5,000 unit oral capsule [1 capsule every day]    Warfarin Sodium 3mg Tablet [1 tab daily]    atorvastatin 80 mg oral tablet [1 tab hs]    Pantoprazole 40 mg oral tablet, delayed release (enteric coated) [1 po q day]    ProAir HFA 90mcg/1actuation Oral Inhaler [1-2 puffs inhalation every 4-6 hours for shortness of breath, chest  tightness, and wheezing.]    Remeron 15 mg oral tablet [Take 1/2 tablet ( 7.5mg) at hs.]        Objective:        Vitals:         Current: 3/6/2020 12:28:41 PM    Ht:  5 ft, 2 in;  Wt: 210.2 lbs;  BMI: 38.4T: 97.7 F (oral);  BP: 126/56 mm Hg (left arm, sitting);  P: 69 bpm (left arm (BP Cuff), sitting);  sCr: 0.73 mg/dL;  GFR: 65.11        Exams:     PHYSICAL EXAM:     GENERAL: vital signs recorded - well developed, well nourished;  no apparent distress;     EYES: lids and lacrimal system are normal in appearance; extraocular movements intact; hyperemic bilateral conjunctiva;  PERRL;     E/N/T:  normal EACs, TMs, nasal/oral mucosa, teeth, gingiva, and oropharynx;     LYMPHATIC: no enlargement of cervical or facial nodes; no supraclavicular nodes;     NEUROLOGIC: GROSSLY INTACT         Assessment:         H10.89   Other conjunctivitis           ORDERS:         Meds Prescribed:       [New Rx] Polytrim 10,000 unit- 1 mg/mL ophthalmic (eye) Drops [2 DROPS IN BOTH EYES TID FOR FIVE DAYS], #10 (ten) milliliters, Refills: 0 (zero)                 Plan:         Other conjunctivitis        RECOMMENDATIONS given include: SEE AN EYE DOCTOR IF NO BETTER AFTER THREE DAYS.  Observe for now Go to ER if worse.            Prescriptions:       [New Rx] Polytrim 10,000 unit- 1 mg/mL ophthalmic (eye) Drops [2 DROPS IN BOTH EYES TID FOR FIVE DAYS], #10 (ten) milliliters, Refills: 0 (zero)             Charge Capture:         Primary Diagnosis:     H10.89  Other conjunctivitis           Orders:      [id number]  Office/outpatient visit; established patient, level 3  (In-House)

## 2021-05-18 NOTE — PROGRESS NOTES
Ping Vance 1935     Office/Outpatient Visit    Visit Date: Tue, Dec 11, 2018 09:39 am    Provider: Gem Baldwin N.P. (Assistant: Juli Joel MA)    Location: [facility]        Electronically signed by Gem Baldwin N.P. on  12/15/2018 08:48:03 AM                             SUBJECTIVE:        CC:     Ms. Jose Wynn is a 83 year old female.  presents today due to complaints of left leg pain after falling this morning         HPI:         Knee pain: Pt does not speak english. Son with her today states pt was walking from her [place] to the [place]. She turned out the light and then tripped on a rug. She fell forward landing on her knee, hip L side. Pt states pain is 10/10. She took a meloxicam this am with little relief. Son states the knee has swollen more toward the bottom that he didn't see earlier before bringing her here.          Hip pain details;     see above Knee pain. Pt fell this am.      ROS:     CONSTITUTIONAL:  Negative for chills, fatigue, fever, and weight change.      EYES:  Negative for blurred vision.      CARDIOVASCULAR:  Negative for chest pain, orthopnea, paroxysmal nocturnal dyspnea and pedal edema.      RESPIRATORY:  Negative for dyspnea.      GASTROINTESTINAL:  Negative for abdominal pain, constipation, diarrhea, nausea and vomiting.      MUSCULOSKELETAL:  Positive for L knee and hip pain.      NEUROLOGICAL:  Negative for dizziness, headaches, paresthesias, and weakness.      PSYCHIATRIC:  Negative for anxiety, depression, and sleep disturbances.          PMH/FMH/SH:     Last Reviewed on 12/11/2018 09:56 AM by Gem Baldwin    Past Medical History:                 PAST MEDICAL HISTORY     Hospitalizations:    Pneumonia last admit date 1-8-16         CURRENT MEDICAL PROVIDERS:    Pulmonologist         PREVENTIVE HEALTH MAINTENANCE             BONE DENSITY: but the results are unknown     COLORECTAL CANCER SCREENING: but the colonoscopy results are  unknown     EYE EXAM: was last done 2/2018 Dr. Gomez     MAMMOGRAM: but the results are unknown         PAST MEDICAL HISTORY                 ADVANCED DIRECTIVES: None         Surgical History:         Cataract Removal: bilateral;         Social History:     Occupation:    Retired. Previous ;     Marital Status:      Children: 5 children         Tobacco/Alcohol/Supplements:     Last Reviewed on 12/11/2018 09:56 AM by Gem Baldwin    Tobacco: She has never smoked.          Substance Abuse History:     Last Reviewed on 12/11/2018 09:56 AM by Gem Baldwin        Mental Health History:     Last Reviewed on 12/11/2018 09:56 AM by Gem Baldwin        Communicable Diseases (eg STDs):     Last Reviewed on 12/11/2018 09:56 AM by Gem Baldwin            Current Problems:     Last Reviewed on 12/11/2018 09:56 AM by Gem Baldwin    Aortic arch thrombus (Neurology)     Chronic allergic conjunctivitis     Generalized weakness     Speech and 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, dysphasia     Old CVA     High cholesterol     GERD     Vitamin D deficiency, unspecified     HTN     Generalized osteoarthritis, multiple sites     COPD     Anticoagulation followup         Immunizations:     Fluzone High-Dose pf (>=65 yr) 12/13/2016     Fluzone High-Dose pf (>=65 yr) 1/8/2018     Fluzone High-Dose pf (>=65 yr) 9/27/2018         Allergies:     Last Reviewed on 12/11/2018 09:56 AM by Gem Baldwin    Naproxen:        Current Medications:     Last Reviewed on 12/11/2018 09:56 AM by Gem Baldwin    Atorvastatin Calcium 80mg Tablet 1 tab hs     Lisinopril 10mg Tablet 1 tab in moring daily     Montelukast Sodium 10mg Tablet 1 tab daily     Pantoprazole 40mg Tablets, Delayed Release 1 po q day     Warfarin Sodium 2.5mg Tablet 1 tab daily or as directed     Warfarin Sodium 3mg Tablet 1 tab daily     Vitamin D3 5,000IU Capsules 1 capsule every day     ProAir HFA 90mcg/1actuation Oral  Inhaler 1-2 puffs inhalation every 4-6 hours for shortness of breath, chest tightness, and wheezing.         OBJECTIVE:        Vitals:         Current: 12/11/2018 9:44:08 AM    Ht:  5 ft, 2 in;  Wt: 217 lbs;  BMI: 39.7    T: 98.6 F (oral);  BP: 148/79 mm Hg (left arm, sitting);  P: 85 bpm (left arm (BP Cuff), sitting);  sCr: 0.71 mg/dL;  GFR: 69.02        Exams:     PHYSICAL EXAM:     GENERAL: vital signs recorded - well developed, well nourished;  no apparent distress;     EYES: extraocular movements intact; conjunctiva and cornea are normal; PERRLA;     NECK: range of motion is normal; thyroid is non-palpable;     RESPIRATORY: normal respiratory rate and pattern with no distress; normal breath sounds with no rales, rhonchi, wheezes or rubs;     CARDIOVASCULAR: normal rate; rhythm is regular;  no systolic murmur; no edema;     MUSCULOSKELETAL: L hip tenderness, pt sitting in wheelchair. L knee without erythema, ecchymosis and edema noted, moderate tenderness, limited ROM.;     NEUROLOGICAL:  cranial nerves, motor and sensory function, reflexes, gait and coordination are all intact;     PSYCHIATRIC:  appropriate affect and demeanor; normal speech pattern; grossly normal memory;         Lab/Test Results:             Urine temperature:  confirmed (12/11/2018),     All urine drug screen levels confirmed negative:  yes (12/11/2018),     Date and time of last pill:  none/ AS (12/11/2018),     Performed by:  tls (12/11/2018),     Collection Time:  10:25 (12/11/2018),     Coumadin (text dose):  3mg daily/ pdr (12/11/2018),     INR:  1.9 (12/11/2018),             ASSESSMENT:           719.46   M25.562  Knee pain              DDx:     719.45   M25.552  Hip pain              DDx:     V58.61   Z79.01  Anticoagulation followup              DDx:         ORDERS:         Meds Prescribed:       Norco (Hydrocodone/Acetaminophen) 7.5mg/325mg Tablet Take 1 tab q 6 hrs prn pain  #12 (Twelve) tablet(s) Refills: 0         Radiology/Test  Orders:       [id number]  Left  radiologic examination, knee; three views  (Send-Out)         [id number]  Left radiologic exam, hip, unilateral; complete, minimum of two views  (Send-Out)           Lab Orders:       [id number]  Drug test prsmv read direct optical obs pr date  (In-House)         75152  PT/INR  (In-House)           Procedures Ordered:       [id number]  Collection of capillary blood specimen (eg, finger, heel, ear stick)  (In-House)                   PLAN:          Knee pain         RADIOLOGY:  I have ordered a left knee x-ray to be done today.            Prescriptions:       Norco (Hydrocodone/Acetaminophen) 7.5mg/325mg Tablet Take 1 tab q 6 hrs prn pain  #12 (Twelve) tablet(s) Refills: 0           Orders:       [id number]  Left  radiologic examination, knee; three views  (Send-Out)         38102  Drug test prsmv read direct optical obs pr date  (In-House)            Hip pain         RADIOLOGY:  I have ordered a left hip x-ray to be done today.            Orders:       [id number]  Left radiologic exam, hip, unilateral; complete, minimum of two views  (Send-Out)            Anticoagulation followup           Orders:       [id number]  Collection of capillary blood specimen (eg, finger, heel, ear stick)  (In-House)         80657  PT/INR  (In-House)               CHARGE CAPTURE:           Primary Diagnosis:     719.46 Knee pain            M25.562    Pain in left knee              Orders:          90707   Office/outpatient visit; established patient, level 4  (In-House)             25858   Drug test prsmv read direct optical obs pr date  (In-House)           719.45 Hip pain            M25.552    Pain in left hip    V58.61 Anticoagulation followup            Z79.01    Long term (current) use of anticoagulants              Orders:          49315   Collection of capillary blood specimen (eg, finger, heel, ear stick)  (In-House)             96265   PT/INR  (In-House)

## 2021-05-18 NOTE — PROGRESS NOTES
Ping Vance 1935     Office/Outpatient Visit    Visit Date: Wed, Oct 31, 2018 10:30 am    Provider: Christelle Alejandre N.P. (Assistant: Hollie Tracy RN)    Location: [facility]        Electronically signed by Christelle Alejandre N.P. on  10/31/2018 11:25:37 AM                             SUBJECTIVE:        CC:     Ms. Jose Wynn is a 83 year old female.  Cough and wheezing (UNSURE IF EVER HAD A PNEUMONIA VAC);         HPI:         Patient complains of bronchitis, acute.  These have been present for the past one week.  The symptoms include chest congestion, productive cough, nasal congestion, sputum production and wheezing.  She denies body aches, Chills, fever or sinus pain/pressure.  She denies exposure to ill contacts.  She has not tried any medications for symptomatic relief.  Medical history is significant for COPD.      ROS:     CONSTITUTIONAL:  Negative for fatigue and fever.      CARDIOVASCULAR:  Negative for chest pain, palpitations, tachycardia, orthopnea, and edema.      RESPIRATORY:  Positive for recent cough ( with scant amounts of purulent sputum ) and frequent wheezing.   Negative for dyspnea.      GASTROINTESTINAL:  Negative for abdominal pain, constipation, diarrhea, nausea and vomiting.      GENITOURINARY:  Negative for dysuria and hematuria.      PSYCHIATRIC:  Negative for anxiety, depression, and sleep disturbances.          PMH/FMH/SH:     Last Reviewed on 10/31/2018 10:52 AM by Christelle Alejandre    Past Medical History:                 PAST MEDICAL HISTORY     Hospitalizations:    Pneumonia last admit date 1-8-16         CURRENT MEDICAL PROVIDERS:    Pulmonologist         PREVENTIVE HEALTH MAINTENANCE             BONE DENSITY: but the results are unknown     COLORECTAL CANCER SCREENING: but the colonoscopy results are unknown     EYE EXAM: was last done 2/2018 Dr. Gomez     MAMMOGRAM: but the results are unknown         PAST MEDICAL HISTORY                  ADVANCED DIRECTIVES: None         Surgical History:         Cataract Removal: bilateral;         Social History:     Occupation:    Retired. Previous ;     Marital Status:      Children: 5 children         Tobacco/Alcohol/Supplements:     Last Reviewed on 10/31/2018 10:52 AM by Christelle Alejandre    Tobacco: She has never smoked.              Current Problems:     Last Reviewed on 10/31/2018 10:52 AM by Christelle Alejandre    Aortic arch thrombus (Neurology)     Chronic allergic conjunctivitis     Generalized weakness     Speech and 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, dysphasia     Old CVA     High cholesterol     GERD     Vitamin D deficiency, unspecified     HTN     Generalized osteoarthritis, multiple sites     COPD     Anticoagulation followup         Immunizations:     Fluzone High-Dose pf (>=65 yr) 12/13/2016     Fluzone High-Dose pf (>=65 yr) 1/8/2018     Fluzone High-Dose pf (>=65 yr) 9/27/2018         Allergies:     Last Reviewed on 10/31/2018 10:52 AM by Christelle Alejandre    Naproxen:        Current Medications:     Last Reviewed on 10/31/2018 10:52 AM by Christelle Alejandre    Atorvastatin Calcium 80mg Tablet 1 tab hs     Lisinopril 10mg Tablet 1 tab in moring daily     Montelukast Sodium 10mg Tablet 1 tab daily     Pantoprazole 40mg Tablets, Delayed Release 1 po q day     Warfarin Sodium 2.5mg Tablet 1 tab daily or as directed     Warfarin Sodium 3mg Tablet 1 tab daily     Vitamin D3 5,000IU Capsules 1 capsule every day         OBJECTIVE:        Vitals:         Current: 10/31/2018 10:37:01 AM    Ht:  5 ft, 2 in;  Wt: 217.6 lbs;  BMI: 39.8    T: 98.4 F (oral);  BP: 125/53 mm Hg (left arm, sitting);  P: 71 bpm (left arm (BP Cuff), sitting, regular);  sCr: 0.71 mg/dL;  GFR: 69.10    O2 Sat: 94 % (room air)        Exams:     PHYSICAL EXAM:     GENERAL: vital signs recorded - well developed, well nourished;  well groomed;  no apparent distress;     E/N/T:  normal EACs, TMs,  nasal/oral mucosa, teeth, gingiva, and oropharynx;     RESPIRATORY: normal respiratory rate and pattern with no distress; coarse breath sounds throughout; diffuse inspiratory and expiratory wheezes;     CARDIOVASCULAR: normal rate; rhythm is regular;  no systolic murmur; no edema;     GASTROINTESTINAL: nontender, nondistended; no hepatosplenomegaly or masses; no bruits;     PSYCHIATRIC:  appropriate affect and demeanor; normal speech pattern; grossly normal memory;         ASSESSMENT           496   J44.9  COPD              DDx:     466.0   J20.8  Bronchitis, acute              DDx:         ORDERS:         Meds Prescribed:       ProAir HFA (Albuterol) 90mcg/1actuation Oral Inhaler 1-2 puffs inhalation every 4-6 hours for shortness of breath, chest tightness, and wheezing.  #1 (One) gm Refills: 1       Amoxicillin 875mg Tablet One PO BID X 10 days.  #20 (Twenty) tablet(s) Refills: 0       Medrol (Methylprednisolone) 4mg Dosepak Take as directed with food  #1 (One) dose pack Refills: 0       Guaifenesin 200mg Tablet take 2 tablets twice daily  #40 (Forty) tablet(s) Refills: 0                 PLAN:          COPD         FOLLOW-UP: Advised to call if there is no improvement Follow-up by phone if no improvement in 1 week..   Schedule follow-up appointments on a p.r.n. basis..            Prescriptions:       ProAir HFA (Albuterol) 90mcg/1actuation Oral Inhaler 1-2 puffs inhalation every 4-6 hours for shortness of breath, chest tightness, and wheezing.  #1 (One) gm Refills: 1       Medrol (Methylprednisolone) 4mg Dosepak Take as directed with food  #1 (One) dose pack Refills: 0          Bronchitis, acute           Prescriptions:       Amoxicillin 875mg Tablet One PO BID X 10 days.  #20 (Twenty) tablet(s) Refills: 0       Guaifenesin 200mg Tablet take 2 tablets twice daily  #40 (Forty) tablet(s) Refills: 0             Patient Recommendations:        For  COPD:     Follow-up by phone if no improvement in 1 week. Schedule  follow-up appointments as needed.                APPOINTMENT INFORMATION:        Monday Tuesday Wednesday Thursday Friday Saturday Sunday            Time:___________________AM  PM   Date:_____________________             CHARGE CAPTURE           **Please note: ICD descriptions below are intended for billing purposes only and may not represent clinical diagnoses**        Primary Diagnosis:         496 COPD            J44.9    Chronic obstructive pulmonary disease, unspecified              Orders:          65525   Office/outpatient visit; established patient, level 4  (In-House)           466.0 Bronchitis, acute            J20.8    Acute bronchitis due to other specified organisms

## 2021-05-18 NOTE — PROGRESS NOTES
Ping Vance  1935     Office/Outpatient Visit    Visit Date: Tue, Dec 22, 2020 10:15 am    Provider: Ricci Means MD (Assistant: Lizeth Oglesby MA)    Location: [facility]        Electronically signed by Ricci Means MD on  12/22/2020 12:27:44 PM                             Subjective:        CC: No longer taking MontelukastMs. Jose Wynn is a 85 year old female.  This is a follow-up visit.          HPI:           Ms. Jose Wynn presents with chronic obstructive pulmonary disease, unspecified..  The symptom began years ago.  STABLE ON CURRENT MEDS           With regard to the gastro-esophageal reflux disease without esophagitis, the location of the discomfort is primarily midline down the esophagus.  She describes the pain as burning.  Symptoms are improved with a proton-pump inhibitor.            Dx with dysthymic disorder; the diagnosis of depression was made several months ago.  Current medications include an antidepressant.  Current affective symptoms include anhedonia, a change in appetite and INSOMNIA.  DOING BETTER WITH THE REMERON     ROS:     CONSTITUTIONAL:  Negative for chills and fever.      E/N/T:  Negative for ear pain, nasal congestion and sore throat.      CARDIOVASCULAR:  Negative for chest pain and pedal edema.      RESPIRATORY:  Negative for recent cough and dyspnea.      GASTROINTESTINAL:  Negative for abdominal pain, anorexia, constipation, diarrhea, nausea and vomiting.      GENITOURINARY:  Negative for dysuria and hematuria.      MUSCULOSKELETAL:  Negative for myalgias.      NEUROLOGICAL:  Negative for headaches and RECENT FALLING.          Past Medical History / Family History / Social History:         Last Reviewed on 12/22/2020 10:33 AM by Ricci Means    Past Medical History:                 PAST MEDICAL HISTORY     Hospitalizations:    Pneumonia         CURRENT MEDICAL PROVIDERS:    Neurologist    Pulmonologist          PREVENTIVE HEALTH MAINTENANCE             BONE DENSITY: was last done IN THE PAST but the results are unknown     COLORECTAL CANCER SCREENING: Up to date (colonoscopy q10y; sigmoidoscopy q5y; Cologuard q3y) was last done IN THE PAST; NORMAL IN THE PAST, RESULTS NOT IN CHART, NO LONGER INDICATED     EYE EXAM: was last done 2/2018     Hepatitis C Medicare Screening: Not indicated     MAMMOGRAM: was last done IN THE PAST but the results are unknown no longer indicated     PAP SMEAR: No longer indicated due to age and history         PAST MEDICAL HISTORY                 ADVANCED DIRECTIVES: None         Surgical History:         Appendectomy    Joint Replacement: LEFT TKR;;;     Cataract Removal: bilateral;     Hysterectomy    COLOSTOMY AND REVERSAL;;;         Social History:     Occupation:    Retired. Previous ;     Marital Status:      Children: 5 children         Tobacco/Alcohol/Supplements:     Last Reviewed on 12/22/2020 10:33 AM by Rcici Means    Tobacco: She has never smoked.          Substance Abuse History:     Last Reviewed on 12/18/2018 11:57 AM by Gem Baldwin        Mental Health History:     Last Reviewed on 12/18/2018 11:57 AM by Gem Baldwin        Communicable Diseases (eg STDs):     Last Reviewed on 12/18/2018 11:57 AM by Gem Baldwin        Current Problems:     Last Reviewed on 12/22/2020 10:33 AM by Ricci Means    Chronic obstructive pulmonary disease, unspecified    Primary generalized (osteo)arthritis    Vitamin D deficiency, unspecified    Gastro-esophageal reflux disease without esophagitis    Pure hypercholesterolemia, unspecified    Unspecified sequelae of cerebral infarction    Other chronic allergic conjunctivitis    Long term (current) use of anticoagulants    Aortic arch thrombus (Neurology)    Localized edema (pedal)    Dysthymic disorder    Functional urinary incontinence        Immunizations:     Influenza, high dose seasonal  1/27/2020    Fluzone High-Dose pf (>=65 yr) 12/13/2016    Fluzone High-Dose pf (>=65 yr) 1/8/2018    Fluzone High-Dose pf (>=65 yr) 9/27/2018        Allergies:     Last Reviewed on 12/22/2020 10:33 AM by Ricci Means    Naproxen:          Current Medications:     Last Reviewed on 12/22/2020 10:33 AM by Ricci Means    cholecalciferol (vitamin D3) 5,000 unit oral capsule [1 capsule every day]    warfarin 3 mg oral tablet [TAKE 1 TABLET BY MOUTH EVERY DAY]    atorvastatin 80 mg oral tablet [TAKE ONE TABLET BY MOUTH EVERY NIGHT AT BEDTIME]    pantoprazole 40 mg oral tablet, delayed release (enteric coated) [TAKE ONE TABLET BY MOUTH EVERY DAY]    montelukast 10 mg oral tablet [TAKE ONE TABLET BY MOUTH EVERY DAY]    ProAir HFA 90mcg/1actuation Oral Inhaler [1-2 puffs inhalation every 4-6 hours for shortness of breath, chest tightness, and wheezing.]    mirtazapine 15 mg oral tablet [TAKE 1/2 TABLET BY MOUTH EVERY NIGHT AT BEDTIME]    Polytrim 10,000 unit- 1 mg/mL ophthalmic (eye) Drops [2 DROPS IN BOTH EYES TID FOR FIVE DAYS]        Objective:        Vitals:         Current: 12/22/2020 10:25:00 AM    Ht:  5 ft, 2 in;  Wt: 215.4 lbs;  BMI: 39.4T: 97.2 F (temporal);  BP: 144/52 mm Hg (right arm, sitting);  P: 83 bpm (right arm (BP Cuff), sitting);  sCr: 0.73 mg/dL;  GFR: 64.67        Exams:     PHYSICAL EXAM:     GENERAL: vital signs recorded - well developed, well nourished;  no apparent distress;     NECK: trachea is midline; thyroid is non-palpable;     RESPIRATORY: normal respiratory rate and pattern with no distress; normal breath sounds with no rales, rhonchi, wheezes or rubs;     CARDIOVASCULAR: normal rate; rhythm is regular;  trace pedal edema;     GASTROINTESTINAL: nontender;     LYMPHATIC: no enlargement of cervical or facial nodes; no supraclavicular nodes;     MUSCULOSKELETAL: gait: uses a cane;  normal overall tone     NEUROLOGIC: GROSSLY INTACT     PSYCHIATRIC: appropriate affect and  demeanor;         Assessment:         J44.9   Chronic obstructive pulmonary disease, unspecified       K21.9   Gastro-esophageal reflux disease without esophagitis       F34.1   Dysthymic disorder           ORDERS:         Other Orders:       1101F  Pt screen for fall risk; document no falls in past year or only 1 fall w/o injury in past year (DEBI)  (In-House)                      Plan:         Chronic obstructive pulmonary disease, unspecified        MEDICATIONS: (no change to current medication regimen)     FOLLOW-UP: Schedule a follow-up visit in 4 months. [facility]     OLD RECORDS REVIEWED MIPS Has had no falls or only one fall without injury in the past year Vaccines Flu and Pneumonia updated in Shot record           Orders:       1101F  Pt screen for fall risk; document no falls in past year or only 1 fall w/o injury in past year (DEBI)  (In-House)              Gastro-esophageal reflux disease without esophagitis        MEDICATIONS: (no change to current medication regimen) Over-the-counter medications recommended include antacids.      OLD RECORDS REVIEWED         Dysthymic disorder        MEDICATIONS: (no change to current medication regimen) Observe as improved Old records reviewed             Patient Recommendations:        For  Chronic obstructive pulmonary disease, unspecified:    Schedule a follow-up visit in 4 months.          For  Gastro-esophageal reflux disease without esophagitis:    Use over-the-counter antacids (i.e. Tums) for pain relief.              Charge Capture:         Primary Diagnosis:     J44.9  Chronic obstructive pulmonary disease, unspecified           Orders:      91079  Office/outpatient visit; established patient, level 4  (In-House)            1101F  Pt screen for fall risk; document no falls in past year or only 1 fall w/o injury in past year (DEBI)  (In-House)              K21.9  Gastro-esophageal reflux disease without esophagitis     F34.1  Dysthymic disorder

## 2021-05-18 NOTE — PROGRESS NOTES
Ping Vance 1935     Office/Outpatient Visit    Visit Date: Mon, Jan 7, 2019 01:59 pm    Provider: Ricci Means MD (Assistant: Jad Gomez)    Location: [facility]        Electronically signed by Ricci Means MD on  01/07/2019 04:32:46 PM                             SUBJECTIVE:        CC:     Ms. Jose Wynn is a 83 year old female.  She is here today following a transition of care from an inpatient hospital: [facility] for left knee wound. The patient was admitted on 12/19-12/30.  (only taking coumadin and antibiotics)         HPI:         Ms. Jose Wynn presents in follow up from hospital admission She was diagnosed with CELLULITIS OF THE LEFT KNEE.  The patient received IV medications of which the patient cannot recall.  The patient's course has improved.  It is of severe intensity.  IS NOW ON HOME I.V. ABX AND WILL SEE I.D. AND ORTHO IN F/U SINCE SHE IS S/P 0A TKR.  MOST OF HER ROUTINE MEDS ARE ON HOLD.      ROS:     CONSTITUTIONAL:  Positive for fatigue.      E/N/T:  Negative for ear pain, nasal congestion and sore throat.      CARDIOVASCULAR:  Negative for chest pain and pedal edema.      RESPIRATORY:  Negative for recent cough and dyspnea.      GASTROINTESTINAL:  Negative for abdominal pain, nausea and vomiting.      MUSCULOSKELETAL:  Negative for myalgias.      NEUROLOGICAL:  Negative for headaches.          PMH/FMH/SH:     Last Reviewed on 1/07/2019 02:31 PM by Ricci Means    Past Medical History:                 PAST MEDICAL HISTORY     Hospitalizations:    Pneumonia last admit date 1-8-16         CURRENT MEDICAL PROVIDERS:    Pulmonologist         PREVENTIVE HEALTH MAINTENANCE             BONE DENSITY: but the results are unknown     COLORECTAL CANCER SCREENING: but the colonoscopy results are unknown     EYE EXAM: was last done 2/2018 Dr. Gomez     MAMMOGRAM: but the results are unknown         PAST MEDICAL HISTORY                 ADVANCED DIRECTIVES:  None         Surgical History:         Cataract Removal: bilateral;         Social History:     Occupation:    Retired. Previous ;     Marital Status:      Children: 5 children         Tobacco/Alcohol/Supplements:     Last Reviewed on 1/07/2019 02:30 PM by Ricci Means    Tobacco: She has never smoked.          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, dysphasia     Old CVA     High cholesterol     GERD     Vitamin D deficiency, unspecified     HTN     Generalized osteoarthritis, multiple sites     COPD     Follow-up examination     Cellulitis     Anticoagulation followup         Immunizations:     Fluzone High-Dose pf (>=65 yr) 12/13/2016     Fluzone High-Dose pf (>=65 yr) 1/8/2018     Fluzone High-Dose pf (>=65 yr) 9/27/2018         Allergies:     Last Reviewed on 1/07/2019 02:08 PM by Jad Gomez    Naproxen:        Current Medications:     Last Reviewed on 12/18/2018 11:57 AM by Gem Baldwin    Atorvastatin Calcium 80mg Tablet 1 tab hs     Lisinopril 10mg Tablet 1 tab in moring daily     Montelukast Sodium 10mg Tablet 1 tab daily     Pantoprazole 40mg Tablets, Delayed Release 1 po q day     Warfarin Sodium 2.5mg Tablet 1 tab daily or as directed     Warfarin Sodium 3mg Tablet 1 tab daily     Vitamin D3 5,000IU Capsules 1 capsule every day     Norco 7.5mg/325mg Tablet Take 1 tab q 6 hrs prn pain         OBJECTIVE:        Vitals:         Current: 1/7/2019 2:10:50 PM    Ht:  5 ft, 2 in    T:  97.5 F (oral);  BP: 148/57 mm Hg (right arm, sitting);  P: 66 bpm (right arm (BP Cuff), sitting);  sCr: 0.71 mg/dL;  GFR: 60.34        Exams:     PHYSICAL EXAM:     GENERAL: vital signs recorded - well developed, well nourished;  no apparent distress;     NECK: trachea is midline; thyroid is non-palpable;     RESPIRATORY: normal respiratory rate and pattern with no distress; normal breath sounds with no rales, rhonchi, wheezes or rubs;     CARDIOVASCULAR: normal rate; rhythm is regular;  no edema;     LYMPHATIC: no enlargement of cervical or facial nodes;     BREAST/INTEGUMENT: LARGE ESCHAR ON LEFT KNEE;     NEUROLOGIC: GROSSLY INTACT     PSYCHIATRIC: appropriate affect and demeanor;         Lab/Test Results:             Coumadin (text dose):  3mg daily/ pdr (01/07/2019),     INR:  2.0 (01/07/2019),             ASSESSMENT           682.6   L03.116  Left Cellulitis of knee              DDx:     V58.61   Z79.01  Anticoagulation followup              DDx:         ORDERS:         Lab Orders:       [id number]  PT/INR  (In-House)           Procedures Ordered:       [id number]  Collection of capillary blood specimen (eg, finger, heel, ear stick)  (In-House)                   PLAN:         Left Cellulitis of knee         FOLLOW-UP: Schedule a follow-up visit in 2 months.      HOME HEALTH TO FOLLOW.  PLANS PER I.D. AND ORTHO.           Anticoagulation followup           Orders:       [id number]  Collection of capillary blood specimen (eg, finger, heel, ear stick)  (In-House)         [id number]  PT/INR  (In-House)               Patient Recommendations:        For Left Cellulitis of knee:     Schedule a follow-up visit in 2 months.  I also recommend HOME HEALTH TO FOLLOW.  PLANS PER I.D. AND ORTHO..              CHARGE CAPTURE           **Please note: ICD descriptions below are intended for billing purposes only and may not represent clinical diagnoses**        Primary Diagnosis:         682.6 Left Cellulitis of knee            L03.116     Cellulitis of left lower limb              Orders:          [id number]   Transitional care manage service 14 day discharge  (In-House)           V58.61 Anticoagulation followup            Z79.01    Long term (current) use of anticoagulants              Orders:          [id number]   Collection of capillary blood specimen (eg, finger, heel, ear stick)  (In-House)             [id number]   PT/INR  (In-House)

## 2021-05-18 NOTE — PROGRESS NOTES
Ping Vance 1935     Office/Outpatient Visit    Visit Date: Tue, Dec 18, 2018 11:17 am    Provider: Gem Baldwin N.P. (Assistant: Juli Joel MA)    Location: [facility]        Electronically signed by Gem Baldwin N.P. on  12/18/2018 06:45:11 PM                             SUBJECTIVE:        CC:     Ms. Jose Wynn is a 83 year old female.  This is a follow-up visit.  Patient presents today for follow up on left knee pain, also states the bandage caused her skin to burn and create infection;         HPI:         Cellulitis: Pt fell on 12-11-18. She was seen in this office with negative xray. Pt was given an ace bandage d/t swelling. Son states they took the bandage off the next day, stated the bandage was stuck and it pulled tissue. States asking the pharmacist for a topical. It was recommended they use a peroxide spray and an ointment. Son does not recall name of med.  States that night they noticed dark tissue arising. It has gotten worse since that day, approx 4 days ago.      ROS:     CONSTITUTIONAL:  Negative for chills, fatigue, fever, and weight change.      EYES:  Negative for blurred vision.      CARDIOVASCULAR:  Negative for chest pain, orthopnea, paroxysmal nocturnal dyspnea and pedal edema.      RESPIRATORY:  Negative for dyspnea.      GASTROINTESTINAL:  Negative for abdominal pain, constipation, diarrhea, nausea and vomiting.      NEUROLOGICAL:  Negative for dizziness, headaches, paresthesias, and weakness.      PSYCHIATRIC:  Negative for anxiety, depression, and sleep disturbances.          PMH/FM/:     Last Reviewed on 12/18/2018 11:57 AM by Gem Baldwin    Past Medical History:                 PAST MEDICAL HISTORY     Hospitalizations:    Pneumonia last admit date 1-8-16         CURRENT MEDICAL PROVIDERS:    Pulmonologist         PREVENTIVE HEALTH MAINTENANCE             BONE DENSITY: but the results are unknown     COLORECTAL CANCER SCREENING:  but the colonoscopy results are unknown     EYE EXAM: was last done 2/2018 Dr. Gomez     MAMMOGRAM: but the results are unknown         PAST MEDICAL HISTORY                 ADVANCED DIRECTIVES: None         Surgical History:         Cataract Removal: bilateral;         Social History:     Occupation:    Retired. Previous ;     Marital Status:      Children: 5 children         Tobacco/Alcohol/Supplements:     Last Reviewed on 12/18/2018 11:57 AM by Gem Baldwin    Tobacco: She has never smoked.          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, dysphasia     Old CVA     High cholesterol     GERD     Vitamin D deficiency, unspecified     HTN     Generalized osteoarthritis, multiple sites     COPD     Knee pain     Anticoagulation followup         Immunizations:     Fluzone High-Dose pf (>=65 yr) 12/13/2016     Fluzone High-Dose pf (>=65 yr) 1/8/2018     Fluzone High-Dose pf (>=65 yr) 9/27/2018         Allergies:     Last Reviewed on 12/18/2018 11:57 AM by Gem Bladwin    Naproxen:        Current Medications:     Last Reviewed on 12/18/2018 11:57 AM by Gem Baldwin    Atorvastatin Calcium 80mg Tablet 1 tab hs     Lisinopril 10mg Tablet 1 tab in moring daily     Montelukast Sodium 10mg Tablet 1 tab daily     Pantoprazole 40mg Tablets, Delayed Release 1 po q day     Warfarin Sodium 2.5mg Tablet 1 tab daily or as directed     Warfarin Sodium 3mg Tablet 1 tab daily     Vitamin D3 5,000IU Capsules  1 capsule every day     Norco 7.5mg/325mg Tablet Take 1 tab q 6 hrs prn pain     ProAir HFA 90mcg/1actuation Oral Inhaler 1-2 puffs inhalation every 4-6 hours for shortness of breath, chest tightness, and wheezing.         OBJECTIVE:        Vitals:         Current: 12/18/2018 11:23:53 AM    Ht:  5 ft, 2 in;  Wt: 219.6 lbs;  BMI: 40.2    T: 97.2 F (oral);  BP: 128/58 mm Hg (left arm, sitting);  P: 77 bpm (left arm (BP Cuff), sitting);  sCr: 0.71 mg/dL;  GFR: 69.37        Exams:     PHYSICAL EXAM:     GENERAL: vital signs recorded - well developed, well nourished;  no apparent distress;     EYES: extraocular movements intact; conjunctiva and cornea are normal; PERRLA;     NECK: range of motion is normal; thyroid is non-palpable;     RESPIRATORY: normal respiratory rate and pattern with no distress; normal breath sounds with no rales, rhonchi, wheezes or rubs;     CARDIOVASCULAR: normal rate; rhythm is regular;  no systolic murmur; no edema;     BREAST/INTEGUMENT: L knee erythematous, warm wound with necrotic tissue. See scanned pic.;     NEUROLOGICAL:  cranial nerves, motor and sensory function, reflexes, gait and coordination are all intact;     PSYCHIATRIC:  appropriate affect and demeanor; normal speech pattern; grossly normal memory;         ASSESSMENT:           682.9   L03.116  Cellulitis              DDx:         ORDERS:         Meds Prescribed:       Refill of: Norco (Hydrocodone/Acetaminophen) 7.5mg/325mg Tablet Take 1 tab q 6 hrs prn pain  #12 (Twelve) tablet(s) Refills: 0         Procedures Ordered:       REFER  Referral to Specialist or Other Facility  (Send-Out)                   PLAN:          Cellulitis         REFERRALS:  Referral initiated to [facility].            Prescriptions:       Refill of: Norco (Hydrocodone/Acetaminophen) 7.5mg/325mg Tablet Take 1 tab q 6 hrs prn pain  #12 (Twelve) tablet(s) Refills: 0           Orders:       REFER  Referral to Specialist or Other Facility  (Send-Out)                Patient Recommendations:        For  Cellulitis:     I also recommend [facility].              CHARGE CAPTURE:           Primary Diagnosis:     682.9 Cellulitis            L03.116    Cellulitis of left lower limb              Orders:          [id number]   Office/outpatient visit; established patient, level 3  (In-House)

## 2021-05-18 NOTE — PROGRESS NOTES
Ping Vance 1935     Office/Outpatient Visit    Visit Date: Tue, Mar 26, 2019 02:29 pm    Provider: Ricci Means MD (Assistant: Ryanne Mujica MA)    Location: [facility]        Electronically signed by Ricci Means MD on  03/26/2019 05:19:41 PM                             SUBJECTIVE:        CC: (NOT TAKING LISINOPRIL)     Ms. Jose Wynn is a 83 year old female.  This is a follow-up visit.  blood pressure;         HPI:         Ms. Jose Wynn presents with hTN.  This was first diagnosed several years ago.  She is not currently taking an antihypertensive.  She has not kept a blood pressure diary, but states that pressures have been well controlled.  Compliance with treatment has been good.  MED WAS STOPPED WHILE SHE WAS IN THE HOSPITAL     ROS:     CONSTITUTIONAL:  Positive for fatigue.      CARDIOVASCULAR:  Positive for pedal edema ( mild ).   Negative for chest pain.      RESPIRATORY:  Negative for recent cough and dyspnea.      GASTROINTESTINAL:  Negative for abdominal pain, nausea and vomiting.      MUSCULOSKELETAL:  Negative for myalgias.      NEUROLOGICAL:  Positive for weakness ( generalized ).   Negative for headaches.          PM/FM/SH:     Last Reviewed on 3/26/2019 05:11 PM by Ricci Means    Past Medical History:                 PAST MEDICAL HISTORY     Hospitalizations:    Pneumonia         CURRENT MEDICAL PROVIDERS:    Pulmonologist         PREVENTIVE HEALTH MAINTENANCE             BONE DENSITY: but the results are unknown     COLORECTAL CANCER SCREENING: but the colonoscopy results are unknown     EYE EXAM: was last done 2/2018     MAMMOGRAM: but the results are unknown         PAST MEDICAL HISTORY                 ADVANCED DIRECTIVES: None         Surgical History:         Appendectomy    Joint Replacement: LEFT TKR;;;      Cataract Removal: bilateral;     Hysterectomy    COLOSTOMY AND REVERSAL;;;         Social History:     Occupation:     Retired. Previous ;     Marital Status:      Children: 5 children         Tobacco/Alcohol/Supplements:     Last Reviewed on 3/26/2019 05:08 PM by Ricci Means    Tobacco: She has never smoked.          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, dysphasia     Old CVA     High cholesterol     GERD     Vitamin D deficiency, unspecified     HTN     Generalized osteoarthritis, multiple sites     COPD     Anticoagulation followup         Immunizations:     Fluzone High-Dose pf (>=65 yr) 12/13/2016     Fluzone High-Dose pf (>=65 yr) 1/8/2018     Fluzone High-Dose pf (>=65 yr) 9/27/2018         Allergies:     Last Reviewed on 3/26/2019 05:08 PM by Ricci Means    Naproxen:        Current Medications:     Last Reviewed on 3/26/2019 05:11 PM by Ricci Means    Atorvastatin Calcium 80mg Tablet 1 tab hs     Montelukast Sodium 10mg Tablet 1 tab daily     Pantoprazole 40mg Tablets, Delayed Release 1 po q day     Warfarin Sodium 2.5mg Tablet 1 tab daily or as directed     Warfarin Sodium 3mg Tablet 1 tab daily     Vitamin D3 5,000IU Capsules 1 capsule every day     ProAir HFA 90mcg/1actuation Oral Inhaler 1-2 puffs inhalation every 4-6 hours for shortness of breath, chest tightness, and wheezing.     Ciprofloxacin HCl 500mg Tablets, Extended Release one tablet bid for 10 days         OBJECTIVE:        Vitals:         Current: 3/26/2019 2:37:26 PM    Ht:  5 ft, 2 in;  Wt: 212.6 lbs;  BMI: 38.9    T:  97.9 F (oral);  BP: 127/38 mm Hg (left arm, sitting);  P: 62 bpm (left arm (BP Cuff), sitting);  sCr: 0.71 mg/dL;  GFR: 68.42        Exams:     PHYSICAL EXAM:     GENERAL: vital signs recorded - well developed, well nourished;  no apparent distress;     NECK: trachea is midline; thyroid is non-palpable;     RESPIRATORY: normal respiratory rate and pattern with no distress; normal breath sounds with no rales, rhonchi, wheezes or rubs;     CARDIOVASCULAR: normal rate; rhythm is regular;     Peripheral Pulses: posterior tibial: 1+ L and 1+ R;  1+ pedal edema;     NEUROLOGIC: GROSSLY INTACT         ASSESSMENT           401.1   I10  HTN              DDx:     782.3   R60.0  Bilateral Pedal edema              DDx:         PLAN:          HTN         RECOMMENDATIONS given include: perform routine monitoring of blood pressure with home blood pressure cuff.      FOLLOW-UP: Schedule a follow-up visit in 4 months.      WILL OBSERVE OFF MEDS         Bilateral Pedal edema         RECOMMENDATIONS given include: limitation of excessive use of the swollen extremity, elevation of the legs as much as possible, restriction of sodium intake, use of support hose, and cmp as above..  Observe for now Consider further workup             Patient Recommendations:        For  HTN:     Begin monitoring your blood pressure by brief nurse visits at our office, a home blood pressure monitor, or by checking on the machines in pharmacies or stores.  Keep a log of the readings.  Schedule a follow-up visit in 4 months.          For Bilateral Pedal edema:     Limit excessive use of the swollen extremity. Elevate the swollen extremity as much as possible to reduce swelling. Restrict the use of salt in your diet. Begin wearing support hose/stockings to improve the circulation in your legs and decrease swelling.              CHARGE CAPTURE           **Please note: ICD descriptions below are intended for billing purposes only and may not represent clinical  diagnoses**        Primary Diagnosis:         401.1 HTN            I10    Essential (primary) hypertension              Orders:          [id number]   Office/outpatient visit; established patient, level 3  (In-House)           782.3 Bilateral Pedal edema            R60.0    Localized edema

## 2021-05-18 NOTE — PROGRESS NOTES
Ping Vance 1935     Office/Outpatient Visit    Visit Date: Wed, Jul 31, 2019 09:43 am    Provider: Ricci Means MD (Assistant: Bessie Nuñez MA)    Location: [facility]        Electronically signed by Ricci Means MD on  07/31/2019 12:54:00 PM                             SUBJECTIVE:        CC:     Ms. Jose Wynn is a 84 year old female.  This is a follow-up visit.  check up;         HPI:         Ms. Jose Wynn presents with gERD.  The location of the discomfort is primarily midline down the esophagus.  She describes the pain as burning.  Symptoms are improved with a proton-pump inhibitor.          Ms. Jose Wynn presents with cOPD..  The symptom began years ago.  STABLE ON CURRENT MEDS         HTN details; this was first diagnosed several years ago.  She is not currently taking an antihypertensive.  She has not kept a blood pressure diary, but states that pressures have been well controlled.  Compliance with treatment has been good.  MED WAS STOPPED WHILE SHE WAS IN THE HOSPITAL     ROS:     CONSTITUTIONAL:  Negative for chills and fever.      E/N/T:  Negative for ear pain, nasal congestion and sore throat.      CARDIOVASCULAR:  Positive for pedal edema ( mild ).   Negative for chest pain.      RESPIRATORY:  Negative for recent cough and dyspnea.      GASTROINTESTINAL:  Negative for abdominal pain, nausea and vomiting.      MUSCULOSKELETAL:  Positive for arthralgias (OCCASIONAL PAIN IN HER NECK).   Negative for myalgias.      INTEGUMENTARY/BREAST:  Positive for RECENT ABRASION, NEEDS NEW RX FOR NEOSPORIN.      NEUROLOGICAL:  Negative for headaches and weakness.          PMH/FMH/SH:     Last Reviewed on 7/31/2019 09:56 AM by Ricci Means    Past Medical History:                 PAST MEDICAL HISTORY     Hospitalizations:    Pneumonia         CURRENT MEDICAL PROVIDERS:    Pulmonologist         PREVENTIVE HEALTH MAINTENANCE             BONE DENSITY: but the  results are unknown     COLORECTAL CANCER SCREENING: but the colonoscopy results are unknown     EYE EXAM: was last done 2/2018     MAMMOGRAM: but the results are unknown         PAST MEDICAL HISTORY                 ADVANCED DIRECTIVES: None         Surgical History:         Appendectomy    Joint Replacement: LEFT TKR;;;      Cataract Removal: bilateral;     Hysterectomy    COLOSTOMY AND REVERSAL;;;         Social History:     Occupation:    Retired. Previous ;     Marital Status:      Children: 5 children         Tobacco/Alcohol/Supplements:     Last Reviewed on 7/31/2019 09:55 AM by Ricci Means    Tobacco: She has never smoked.          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, dysphasia     Old CVA     High cholesterol     GERD     Vitamin D deficiency, unspecified     HTN     Generalized osteoarthritis, multiple sites     COPD     Pedal edema     Anticoagulation followup         Immunizations:     Fluzone High-Dose pf (>=65 yr) 12/13/2016     Fluzone High-Dose pf (>=65 yr) 1/8/2018     Fluzone High-Dose pf (>=65 yr) 9/27/2018         Allergies:     Last Reviewed on 7/31/2019 09:55 AM by Ricci Means    Naproxen:        Current Medications:     Last Reviewed on 7/31/2019 09:57 AM by Ricci Means    Atorvastatin Calcium 80mg Tablet 1 tab hs     Pantoprazole 40mg Tablets, Delayed Release 1 po q day     ProAir HFA 90mcg/1actuation Oral Inhaler 1-2 puffs inhalation every 4-6 hours  for shortness of breath, chest tightness, and wheezing.     Warfarin Sodium 2.5mg Tablet 1 tab daily or as directed     Warfarin Sodium 3mg Tablet 1 tab daily     Vitamin D3 5,000IU Capsules 1 capsule every day         OBJECTIVE:        Vitals:         Current: 7/31/2019 9:48:41 AM    Ht:  5 ft, 2 in;  Wt: 213 lbs;  BMI: 39.0    T: 97.9 F (oral);  BP: 141/53 mm Hg (left arm, sitting);  P: 63 bpm (left arm (BP Cuff), sitting);  sCr: 0.71 mg/dL;  GFR: 67.33        Exams:     PHYSICAL EXAM:     GENERAL: vital signs recorded - well developed, well nourished;  no apparent distress;     NECK: range of motion is normal; trachea is midline; thyroid is non-palpable;     RESPIRATORY: normal respiratory rate and pattern with no distress; normal breath sounds with no rales, rhonchi, wheezes or rubs;     CARDIOVASCULAR: normal rate; rhythm is regular;     Peripheral Pulses: posterior tibial: 1+ L and 1+ R;  1+ pedal edema;     GASTROINTESTINAL: nontender;     NEUROLOGIC: GROSSLY INTACT         ASSESSMENT           530.81   K21.9  GERD              DDx:     496   J44.9  COPD              DDx:     401.1   I10  HTN              DDx:         ORDERS:         Meds Prescribed:       Triple Antibiotic (Bacitracin/Neomycin/Polymyxin B) Topical Ointment Apply sufficient amount to affected area 2 times daily  #30 (Thirty) gm Refills: 0         Lab Orders:       APPTO  Appointment need  (In-House)                   PLAN:          GERD         MEDICATIONS: (no change to current medication regimen) Over-the-counter medications recommended include antacids.      FOLLOW-UP: Schedule a follow-up visit in 6 months..   for LABS AT NEXT VISIT for Medicare Wellness Visit           Orders:       APPTO  Appointment need  (In-House)            COPD         MEDICATIONS: (no change to current medication regimen)          HTN         RECOMMENDATIONS given include: perform routine monitoring of blood pressure with home blood pressure cuff.      WILL  OBSERVE OFF MEDS             Other Prescriptions:       Triple Antibiotic (Bacitracin/Neomycin/Polymyxin B) Topical Ointment Apply sufficient amount to affected area 2 times daily  #30 (Thirty) gm Refills: 0         Patient Recommendations:        For  GERD:     Use over-the-counter antacids (i.e. Tums) for pain relief.  Schedule a follow-up visit in 6 months.                APPOINTMENT INFORMATION:        Monday Tuesday Wednesday Thursday Friday Saturday Sunday            Time:___________________AM  PM   Date:_____________________         For  HTN:     Begin monitoring your blood pressure by brief nurse visits at our office, a home blood pressure monitor, or by checking on the machines in pharmacies or stores.  Keep a log of the readings.              CHARGE CAPTURE           **Please note: ICD descriptions below are intended for billing purposes only and may not represent clinical diagnoses**        Primary Diagnosis:         530.81 GERD            K21.9    Gastro-esophageal reflux disease without esophagitis              Orders:          13115   Office/outpatient visit; established patient, level 4  (In-House)             APPTO   Appointment need  (In-House)           496 COPD            J44.9    Chronic obstructive pulmonary disease, unspecified    401.1 HTN            I10    Essential (primary) hypertension

## 2021-05-18 NOTE — PROGRESS NOTES
Ping Vance  1935     Office/Outpatient Visit    Visit Date: Mon, Feb 24, 2020 10:05 am    Provider: Ricci Means MD (Assistant: Ryanne Mujica MA)    Location: [facility]        Electronically signed by Ricci Means MD on  02/24/2020 01:44:56 PM                             Subjective:        CC: Ms. Jose Wynn is a 84 year old female.  This is a follow-up visit.  check up;         HPI:           Patient presents with pure hypercholesterolemia, unspecified.  Current treatment includes a lipid lowering agent.  Compliance with treatment has been good.  Most recent lab tests include Total Cholesterol:  120 (mg/dL) (01/27/2020), HDL:  46 (mg/dL) (01/27/2020), Triglycerides:  101 (mg/dL) (01/27/2020), LDL:  54 (mg/dL) (01/27/2020).            In regard to the dysthymic disorder, the diagnosis of depression was made several months ago.  Current medications include an antidepressant.  Current affective symptoms include anhedonia, a change in appetite and INSOMNIA.  DOING BETTER WITH THE REMERON     ROS:     CONSTITUTIONAL:  Negative for chills and fever.      GASTROINTESTINAL:  Negative for abdominal pain, anorexia, constipation, diarrhea, nausea and vomiting.      GENITOURINARY:  Positive for urinary incontinence CHRONIC BUT WORSE LATELY.   Negative for dysuria or hematuria.      NEUROLOGICAL:  Negative for headaches.          Past Medical History / Family History / Social History:         Last Reviewed on 2/24/2020 10:15 AM by Ricci Means    Past Medical History:                 PAST MEDICAL HISTORY     Hospitalizations:    Pneumonia         CURRENT MEDICAL PROVIDERS:    Neurologist    Pulmonologist         PREVENTIVE HEALTH MAINTENANCE             BONE DENSITY: was last done IN THE PAST but the results are unknown     COLORECTAL CANCER SCREENING: Up to date (colonoscopy q10y; sigmoidoscopy q5y; Cologuard q3y) was last done IN THE PAST; NORMAL IN THE PAST,  RESULTS NOT IN CHART, NO LONGER INDICATED     EYE EXAM: was last done 2/2018     Hepatitis C Medicare Screening: Not indicated     MAMMOGRAM: was last done IN THE PAST but the results are unknown no longer indicated     PAP SMEAR: No longer indicated due to age and history         PAST MEDICAL HISTORY                 ADVANCED DIRECTIVES: None         Surgical History:         Appendectomy    Joint Replacement: LEFT TKR;;;     Cataract Removal: bilateral;     Hysterectomy    COLOSTOMY AND REVERSAL;;;         Social History:     Occupation:    Retired. Previous ;     Marital Status:      Children: 5 children         Tobacco/Alcohol/Supplements:     Last Reviewed on 2/24/2020 10:15 AM by Ricci Means    Tobacco: She has never smoked.          Substance Abuse History:     Last Reviewed on 12/18/2018 11:57 AM by Gem Baldwin        Mental Health History:     Last Reviewed on 12/18/2018 11:57 AM by Gem Baldwin        Communicable Diseases (eg STDs):     Last Reviewed on 12/18/2018 11:57 AM by Gem Baldwin        Current Problems:     Last Reviewed on 2/24/2020 10:15 AM by Ricci Means    Primary generalized (osteo)arthritis    Chronic obstructive pulmonary disease, unspecified    Vitamin D deficiency, unspecified    Gastro-esophageal reflux disease without esophagitis    Old CVA    Weakness-generalized    Unspecified sequelae of cerebral infarction    Dysphasia following unspecified cerebrovascular disease    Pure hypercholesterolemia, unspecified    Other chronic allergic conjunctivitis    Long term (current) use of anticoagulants    Aortic arch thrombus (Neurology)    Localized edema (pedal)    Dysthymic disorder        Immunizations:     Influenza, high dose seasonal 1/27/2020    Fluzone High-Dose pf (>=65 yr) 12/13/2016    Fluzone High-Dose pf (>=65 yr) 1/8/2018    Fluzone High-Dose pf (>=65 yr) 9/27/2018        Allergies:     Last Reviewed on 2/24/2020 10:15 AM by  Ricci Means    Naproxen:          Current Medications:     Last Reviewed on 2/24/2020 10:15 AM by Ricci Means    cholecalciferol (vitamin D3) 5,000 unit oral capsule [1 capsule every day]    Warfarin Sodium 3mg Tablet [1 tab daily]    atorvastatin 80 mg oral tablet [1 tab hs]    Pantoprazole 40 mg oral tablet, delayed release (enteric coated) [1 po q day]    montelukast 10 mg oral tablet [TAKE 1 TABLET BY MOUTH EVERY DAY]    ProAir HFA 90mcg/1actuation Oral Inhaler [1-2 puffs inhalation every 4-6 hours for shortness of breath, chest tightness, and wheezing.]    Remeron 15 mg oral tablet [Take 1/2 tablet ( 7.5mg) at hs.]        Objective:        Vitals:         Current: 2/24/2020 10:12:09 AM    Ht:  5 ft, 2 in;  Wt: 208 lbs;  BMI: 38.0T: 98.5 F (oral);  BP: 127/41 mm Hg (right arm, sitting);  P: 71 bpm (right arm (BP Cuff), sitting);  sCr: 0.73 mg/dL;  GFR: 64.82        Exams:     PHYSICAL EXAM:     GENERAL: vital signs recorded - well developed, well nourished;  no apparent distress;     RESPIRATORY: normal respiratory rate and pattern with no distress; normal breath sounds with no rales, rhonchi, wheezes or rubs;     CARDIOVASCULAR: normal rate; rhythm is regular;  trace pedal edema;     GASTROINTESTINAL: nontender;     LYMPHATIC: no enlargement of cervical or facial nodes; no supraclavicular nodes;     NEUROLOGIC: GROSSLY INTACT     PSYCHIATRIC: appropriate affect and demeanor;         Assessment:         E78.00   Pure hypercholesterolemia, unspecified       F34.1   Dysthymic disorder       R39.81   Functional urinary incontinence           ORDERS:         Lab Orders:       [id number]  [facility] Urinalysis, automated, with micro  (Send-Out)                      Plan:         Pure hypercholesterolemia, unspecified        MEDICATIONS: (no change to current medication regimen)     FOLLOW-UP: Schedule a follow-up visit in 3 months.          Dysthymic disorder        MEDICATIONS: (no change to  current medication regimen) Observe as improved         Functional urinary incontinence    LABORATORY:  Labs ordered to be performed today include urinalysis with micro.            Orders:       [id number]  [facility] Urinalysis, automated, with micro  (Send-Out)                  Patient Recommendations:        For  Pure hypercholesterolemia, unspecified:    Schedule a follow-up visit in 3 months.              Charge Capture:         Primary Diagnosis:     E78.00  Pure hypercholesterolemia, unspecified           Orders:      [id number]  Office/outpatient visit; established patient, level 4  (In-House)              F34.1  Dysthymic disorder     R39.81  Functional urinary incontinence

## 2021-05-18 NOTE — PROGRESS NOTES
Ping Vance 1935     Office/Outpatient Visit    Visit Date: Mon, Aug 20, 2018 10:47 am    Provider: Ricci Means MD (Assistant: Joe Gardner LPN)    Location: [facility]        Electronically signed by Ricci Means MD on  08/20/2018 05:19:20 PM                             SUBJECTIVE:        HPI: Ping is here for a follow up visit.  She is currently on warfarin since her stroke one year ago.  She tries to avoid green vegetables to keep her Vitamin K levels low.  She gets her INR checked every 2-3 weeks.  Needs to make an appointment in [place] for stroke follow up.        Ping complains of recent dry cough in the morning and when she goes to bed.  This has been going on for a while.        She also complains of chest pressure and burning pain when eating slowly.  This started a couple of weeks ago.  If she eats quickly, she doesn't feel it.  She takes Pantoprazole 40mg.        Ping has chronic pain in her legs.  She has not been exercising and has gained a few pounds.  Her son attributes her difficulty rising from a chair to gaining weight and not exercising.  She reports low energy and not feeling like exercising lately.        She checks her blood pressure at home and it has been running in the 120s/80s.        She is due for her PNA vaccine, flu vaccine.         Ms. Jose Wynn is here for a Medicare wellness visit.  Individual and family medical history was reviewed and updated A list of current providers and suppliers reviewed and updated A current height, weight, BMI, blood pressure, and pulse were recorded in the vitals section of the note and have been reviewed A review of possible cognitive impairment was performed and the following was noted: she is not having trouble driving;  memory changes are noted;  she is having trouble with her finances A review of functional ability and level of saftely was performed and the following was noted: Bathing ( Performs  with assistance ); Dressing: ( Performs with assistance ); Eating: ( Unable to perform ); Toilet use: ( Performs with assistance ); Transferring: ( Performs with assistance ) She denies having trouble hearing the TV/radio when others do not, having to strain to hear or understand conversations and wearing hearing aid(s).  Concerning home safety, She denies her home having throw rugs, poor lighting and a slippery bath or shower.   She is in need of grab bars in the bath, but not handrails on stairs or functioning smoke alarms.      Immunization Status: Flu shot only;     Physical Activity: Exercises at least 3 times per week; Has not had any falls or only one fall without injury in the past year.  Advance Care Directive updated today in [facility] Tobacco: She has never smoked.    Preventative Health updated today         In regard to the high cholesterol, current treatment includes a lipid lowering agent.  Compliance with treatment has been good.  She does not know results of any recent lab monitoring.          Concerning gERD, the location of the discomfort is primarily midline down the esophagus.  She describes the pain as burning.  Symptoms are improved with a proton-pump inhibitor.  STILL OCCASIONAL HEARTBURN         Additionally, she presents with history of hTN.  her current cardiac medication regimen includes an ACE inhibitor.  Compliance with treatment has been good.      ROS:     CONSTITUTIONAL:  Positive for fatigue.      CARDIOVASCULAR:  Negative for chest pain.      RESPIRATORY:  Positive for recent cough ( typically dry ).   Negative for dyspnea.      GASTROINTESTINAL:  Positive for abdominal pain ( epigastric ) and heartburn.   Negative for nausea or vomiting.      MUSCULOSKELETAL:  Positive for limb pain ( bilateral leg pain ).      NEUROLOGICAL:  Negative for headaches.      ALLERGIC/IMMUNOLOGIC:  Negative for seasonal allergies.      PSYCHIATRIC:  Negative for depression.          PMH/FMH/SH:     Last  Reviewed on 8/20/2018 12:10 PM by Ricci Means    Past Medical History:                 PAST MEDICAL HISTORY     Hospitalizations:    Pneumonia last admit date 1-8-16         CURRENT MEDICAL PROVIDERS:    Pulmonologist         PREVENTIVE HEALTH MAINTENANCE             BONE DENSITY: but the results are unknown     COLORECTAL CANCER SCREENING: but the colonoscopy results are unknown     EYE EXAM: was last done 2/2018 Dr. Gomez     MAMMOGRAM: but the results are unknown         PAST MEDICAL HISTORY                 ADVANCED DIRECTIVES: None         Surgical History:         Cataract Removal: bilateral;         Tobacco/Alcohol/Supplements:     Last Reviewed on 8/20/2018 12:08 PM by Ricci Means    Tobacco: She has never smoked.          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, dysphasia     Old CVA     High cholesterol     GERD     Vitamin D deficiency, unspecified     HTN     Generalized osteoarthritis, multiple sites     COPD     Anticoagulation followup         Immunizations:     Fluzone High-Dose pf (>=65 yr) 12/13/2016     Fluzone High-Dose pf (>=65 yr) 1/8/2018         Allergies:     Last Reviewed on 8/20/2018 12:08 PM by Ricci Means    Naproxen:        Current Medications:     Last Reviewed on 12/21/2017 09:31 AM by Danelle Bruno    Warfarin Sodium 2.5mg Tablet 1 tab daily or as directed     Lisinopril 10mg Tablet 1 tab in moring daily     Pantoprazole 40mg Tablets, Delayed Release 1 po q day     Atorvastatin Calcium 80mg Tablet 1 tab hs      Montelukast Sodium 10mg Tablet 1 tab daily     Vitamin D3 5,000IU Capsules 1 capsule every day     Warfarin Sodium 3mg Tablet 1 tab daily         OBJECTIVE:        Vitals:         Current: 8/20/2018 10:53:24 AM    Ht:  5 ft, 2 in;  Wt: 216.2 lbs;  BMI: 39.5    T: 98 F (oral);  BP: 122/33 mm Hg (right arm, sitting);  P: 78 bpm (finger clip, sitting);  sCr: 0.59 mg/dL;  GFR: 82.93    VA: 20/50 OD, 20/100 OS (near, without correction)        Repeat:     11:20:34 AM     BP:   116/46mm Hg (right arm, sitting)         Exams:     PHYSICAL EXAM:     GENERAL: vital signs recorded - well developed, well nourished;  no apparent distress;     EYES: extraocular movements intact;     E/N/T:  normal EACs, TMs, nasal/oral mucosa, teeth, gingiva, and oropharynx;     NECK: trachea is midline; thyroid is non-palpable; carotid exam reveals no bruits;     RESPIRATORY: normal respiratory rate and pattern with no distress; normal breath sounds with no rales, rhonchi, wheezes or rubs;     CARDIOVASCULAR: normal rate; rhythm is regular;  no edema;     GASTROINTESTINAL: nontender;     LYMPHATIC: no enlargement of cervical or facial nodes;     MUSCULOSKELETAL: gait: uses a cane;  normal overall tone     NEUROLOGIC: GROSSLY INTACT     PSYCHIATRIC: appropriate affect and demeanor;         ASSESSMENT           Adult annual physical    V70.0   Z00.00  Annual exam              DDx:     272.0   E78.00  High cholesterol              DDx:     530.81   K21.9  GERD              DDx:     401.1   I10  HTN              DDx:     268.9   E55.9  Vitamin D deficiency, unspecified              DDx:     438.9   I69.30  Old CVA              DDx:         ORDERS:         Lab Orders:       [id number]  COMP - [facility] Comp. Metabolic Panel  (Send-Out)         [id number]  LPDP - [facility] Lipid Panel  (Send-Out)         21826  BDCBC - [facility] CBC with 3 part diff  (Send-Out)         22372  TSH - [facility] TSH  (Send-Out)         74252  VITD - [facility] Vitamin D, 25 Hydroxy  (Send-Out)            Procedures Ordered:         Annual wellness visit, includes a PPPS, first visit  (In-House)           Other Orders:         Depression screen positive and follow up plan documented  (In-House)                   PLAN:          Annual exam         COUNSELING provided on: fall prevention, healthy eating habits, and Medicare Preventative Services Guide given to patient..      FOLLOW-UP: Schedule a follow-up visit in 3 months.  [facility] PHQ-9 Depression Screening: Completed form scanned and in chart; Total Score 11 Positive Depression Screen: NOT FRANKLY DEPRESSED AT THIS TIME           Orders:         Depression screen positive and follow up plan documented  (In-House)           Annual wellness visit, includes a PPPS, first visit  (In-House)            High cholesterol     LABORATORY:  Labs ordered to be performed today include Comprehensive metabolic panel and lipid panel.            Orders:       [id number]  COMP - [facility] Comp. Metabolic Panel  (Send-Out)         53113  LPDP - [facility] Lipid Panel  (Send-Out)             Patient Education Handouts:       Hyperlipidemia (Hyperlipoproteinemia)           GERD         MEDICATIONS: (no change to current medication regimen) Over-the-counter medications recommended include antacids.           HTN     LABORATORY:  Labs ordered to be performed today include CBC and TSH.            Orders:       51172  BDCBC - [facility] CBC with 3 part diff  (Send-Out)         36908  TSH - [facility] TSH  (Send-Out)            Vitamin D deficiency, unspecified     LABORATORY:  Labs ordered to be performed today include Vitamin D.            Orders:       [id number]  VITD - [facility] Vitamin D, 25 Hydroxy  (Send-Out)            Old CVA         WE WILL CHECK WITH NEUROLOGY AGAIN RE FU AND TRACE G-TERM COUMADIN THERAPY.              Patient Recommendations:        For  Annual exam:         Regularly exercise within recommended guidelines, especially to maintain balance. Remove obstacles in walkways at home.  Use  non-skid material for bathtub safety.  Remove loose throw rugs from floor. Use nightlights in bedrooms, hallways, and bathrooms.    Limit dietary intake of fat (especially saturated fat) and cholesterol.  Eat a variety of foods, including plenty of fruits, vegetables, and grain containg fiber, limit fat intake to 30% of total calories. Balance caloric intake with energy expended.  Schedule a follow-up visit in 3 months.          For  GERD:     Use over-the-counter antacids (i.e. Tums) for pain relief.          For  Old CVA:     I also recommend WE WILL CHECK WITH NEUROLOGY AGAIN RE FU AND TRACE G-TERM COUMADIN THERAPY..              CHARGE CAPTURE           **Please note: ICD descriptions below are intended for billing purposes only and may not represent clinical diagnoses**        Primary Diagnosis:         Adult annual physical    V70.0 Annual exam            Z00.00    Encounter for general adult medical examination without abnormal findings              Orders:             Depression screen positive and follow up plan documented  (In-House)                Annual wellness visit, includes a PPPS, first visit  (In-House)           272.0 High cholesterol            E78.00    Pure hypercholesterolemia, unspecified    530.81 GERD            K21.9    Gastro-esophageal reflux disease without esophagitis    401.1 HTN            I10    Essential (primary) hypertension    268.9 Vitamin D deficiency, unspecified            E55.9    Vitamin D deficiency, unspecified    438.9 Old CVA            I69.30    Unspecified sequelae of cerebral infarction        ADDENDUMS:      ____________________________________    Addendum: 08/27/2018 10:31 PM - Ricci Means        CC:  FOLLOWUP

## 2021-05-24 ENCOUNTER — CONVERSION ENCOUNTER (OUTPATIENT)
Dept: FAMILY MEDICINE CLINIC | Age: 86
End: 2021-05-24

## 2021-06-23 RX ORDER — MIRTAZAPINE 15 MG/1
TABLET, FILM COATED ORAL
Qty: 15 TABLET | Refills: 0 | Status: SHIPPED | OUTPATIENT
Start: 2021-06-23 | End: 2021-08-16 | Stop reason: SDUPTHER

## 2021-06-25 ENCOUNTER — ANTICOAGULATION VISIT (OUTPATIENT)
Dept: FAMILY MEDICINE CLINIC | Age: 86
End: 2021-06-25

## 2021-06-25 DIAGNOSIS — I74.11 EMBOLISM AND THROMBOSIS OF THORACIC AORTA (HCC): Primary | ICD-10-CM

## 2021-06-25 LAB — INR PPP: 1.6 (ref 2–3)

## 2021-06-25 PROCEDURE — 36416 COLLJ CAPILLARY BLOOD SPEC: CPT | Performed by: FAMILY MEDICINE

## 2021-06-25 PROCEDURE — 93793 ANTICOAG MGMT PT WARFARIN: CPT | Performed by: FAMILY MEDICINE

## 2021-06-25 PROCEDURE — 85610 PROTHROMBIN TIME: CPT | Performed by: FAMILY MEDICINE

## 2021-07-01 VITALS
HEIGHT: 62 IN | BODY MASS INDEX: 40.16 KG/M2 | SYSTOLIC BLOOD PRESSURE: 105 MMHG | HEART RATE: 78 BPM | DIASTOLIC BLOOD PRESSURE: 60 MMHG

## 2021-07-01 VITALS
HEART RATE: 72 BPM | BODY MASS INDEX: 40.16 KG/M2 | DIASTOLIC BLOOD PRESSURE: 58 MMHG | SYSTOLIC BLOOD PRESSURE: 117 MMHG | HEIGHT: 62 IN

## 2021-07-01 VITALS — HEIGHT: 62 IN | SYSTOLIC BLOOD PRESSURE: 138 MMHG | DIASTOLIC BLOOD PRESSURE: 65 MMHG | HEART RATE: 67 BPM

## 2021-07-01 VITALS
HEART RATE: 67 BPM | BODY MASS INDEX: 38.96 KG/M2 | SYSTOLIC BLOOD PRESSURE: 132 MMHG | DIASTOLIC BLOOD PRESSURE: 64 MMHG | HEIGHT: 62 IN

## 2021-07-01 VITALS
HEART RATE: 62 BPM | WEIGHT: 212.6 LBS | DIASTOLIC BLOOD PRESSURE: 38 MMHG | HEIGHT: 62 IN | SYSTOLIC BLOOD PRESSURE: 127 MMHG | TEMPERATURE: 97.9 F | BODY MASS INDEX: 39.12 KG/M2

## 2021-07-01 VITALS
SYSTOLIC BLOOD PRESSURE: 116 MMHG | DIASTOLIC BLOOD PRESSURE: 46 MMHG | BODY MASS INDEX: 39.79 KG/M2 | HEART RATE: 78 BPM | TEMPERATURE: 98 F | WEIGHT: 216.2 LBS | HEIGHT: 62 IN

## 2021-07-01 VITALS
HEIGHT: 62 IN | DIASTOLIC BLOOD PRESSURE: 51 MMHG | SYSTOLIC BLOOD PRESSURE: 123 MMHG | HEART RATE: 72 BPM | BODY MASS INDEX: 38.88 KG/M2

## 2021-07-01 VITALS
DIASTOLIC BLOOD PRESSURE: 58 MMHG | TEMPERATURE: 97.2 F | SYSTOLIC BLOOD PRESSURE: 128 MMHG | BODY MASS INDEX: 40.41 KG/M2 | WEIGHT: 219.6 LBS | HEIGHT: 62 IN | HEART RATE: 77 BPM

## 2021-07-01 VITALS
SYSTOLIC BLOOD PRESSURE: 148 MMHG | HEART RATE: 66 BPM | HEIGHT: 62 IN | DIASTOLIC BLOOD PRESSURE: 57 MMHG | TEMPERATURE: 97.5 F | BODY MASS INDEX: 40.16 KG/M2

## 2021-07-01 VITALS
HEART RATE: 85 BPM | HEIGHT: 62 IN | TEMPERATURE: 98.6 F | WEIGHT: 217 LBS | BODY MASS INDEX: 39.93 KG/M2 | DIASTOLIC BLOOD PRESSURE: 79 MMHG | SYSTOLIC BLOOD PRESSURE: 148 MMHG

## 2021-07-01 VITALS
BODY MASS INDEX: 39.54 KG/M2 | HEIGHT: 62 IN | SYSTOLIC BLOOD PRESSURE: 144 MMHG | TEMPERATURE: 97.2 F | DIASTOLIC BLOOD PRESSURE: 60 MMHG | HEART RATE: 66 BPM

## 2021-07-01 VITALS
HEIGHT: 62 IN | DIASTOLIC BLOOD PRESSURE: 57 MMHG | HEART RATE: 77 BPM | SYSTOLIC BLOOD PRESSURE: 112 MMHG | BODY MASS INDEX: 39.69 KG/M2

## 2021-07-01 VITALS
DIASTOLIC BLOOD PRESSURE: 53 MMHG | SYSTOLIC BLOOD PRESSURE: 125 MMHG | OXYGEN SATURATION: 94 % | TEMPERATURE: 98.4 F | BODY MASS INDEX: 40.04 KG/M2 | HEART RATE: 71 BPM | HEIGHT: 62 IN | WEIGHT: 217.6 LBS

## 2021-07-01 VITALS
BODY MASS INDEX: 40.08 KG/M2 | DIASTOLIC BLOOD PRESSURE: 46 MMHG | HEART RATE: 80 BPM | HEIGHT: 62 IN | WEIGHT: 217.8 LBS | SYSTOLIC BLOOD PRESSURE: 125 MMHG | TEMPERATURE: 97.6 F

## 2021-07-01 VITALS
DIASTOLIC BLOOD PRESSURE: 53 MMHG | BODY MASS INDEX: 39.2 KG/M2 | HEART RATE: 63 BPM | SYSTOLIC BLOOD PRESSURE: 141 MMHG | WEIGHT: 213 LBS | TEMPERATURE: 97.9 F | HEIGHT: 62 IN

## 2021-07-01 VITALS
SYSTOLIC BLOOD PRESSURE: 123 MMHG | HEART RATE: 69 BPM | HEIGHT: 62 IN | BODY MASS INDEX: 38.96 KG/M2 | DIASTOLIC BLOOD PRESSURE: 56 MMHG

## 2021-07-01 VITALS
HEIGHT: 62 IN | HEART RATE: 67 BPM | BODY MASS INDEX: 38.88 KG/M2 | DIASTOLIC BLOOD PRESSURE: 60 MMHG | SYSTOLIC BLOOD PRESSURE: 132 MMHG

## 2021-07-01 VITALS
BODY MASS INDEX: 39.8 KG/M2 | HEIGHT: 62 IN | SYSTOLIC BLOOD PRESSURE: 124 MMHG | HEART RATE: 70 BPM | DIASTOLIC BLOOD PRESSURE: 69 MMHG

## 2021-07-01 VITALS
SYSTOLIC BLOOD PRESSURE: 136 MMHG | HEIGHT: 62 IN | DIASTOLIC BLOOD PRESSURE: 62 MMHG | HEART RATE: 70 BPM | BODY MASS INDEX: 38.88 KG/M2

## 2021-07-01 VITALS
DIASTOLIC BLOOD PRESSURE: 60 MMHG | HEART RATE: 77 BPM | SYSTOLIC BLOOD PRESSURE: 110 MMHG | BODY MASS INDEX: 40.16 KG/M2 | HEIGHT: 62 IN

## 2021-07-01 VITALS — HEART RATE: 57 BPM | SYSTOLIC BLOOD PRESSURE: 126 MMHG | HEIGHT: 62 IN | DIASTOLIC BLOOD PRESSURE: 82 MMHG

## 2021-07-01 VITALS — DIASTOLIC BLOOD PRESSURE: 69 MMHG | SYSTOLIC BLOOD PRESSURE: 137 MMHG | HEART RATE: 55 BPM | HEIGHT: 62 IN

## 2021-07-01 VITALS
SYSTOLIC BLOOD PRESSURE: 122 MMHG | BODY MASS INDEX: 38.96 KG/M2 | HEIGHT: 62 IN | DIASTOLIC BLOOD PRESSURE: 57 MMHG | HEART RATE: 70 BPM

## 2021-07-01 VITALS — HEIGHT: 62 IN | HEART RATE: 60 BPM | DIASTOLIC BLOOD PRESSURE: 59 MMHG | SYSTOLIC BLOOD PRESSURE: 118 MMHG

## 2021-07-01 VITALS — HEART RATE: 56 BPM | HEIGHT: 62 IN | DIASTOLIC BLOOD PRESSURE: 70 MMHG | SYSTOLIC BLOOD PRESSURE: 132 MMHG

## 2021-07-01 VITALS — DIASTOLIC BLOOD PRESSURE: 68 MMHG | SYSTOLIC BLOOD PRESSURE: 131 MMHG | HEART RATE: 61 BPM | HEIGHT: 62 IN

## 2021-07-01 VITALS — SYSTOLIC BLOOD PRESSURE: 132 MMHG | HEART RATE: 56 BPM | HEIGHT: 62 IN | DIASTOLIC BLOOD PRESSURE: 64 MMHG

## 2021-07-01 VITALS — HEIGHT: 62 IN | HEART RATE: 65 BPM | SYSTOLIC BLOOD PRESSURE: 119 MMHG | DIASTOLIC BLOOD PRESSURE: 57 MMHG

## 2021-07-01 VITALS
HEIGHT: 62 IN | BODY MASS INDEX: 38.88 KG/M2 | DIASTOLIC BLOOD PRESSURE: 63 MMHG | SYSTOLIC BLOOD PRESSURE: 139 MMHG | HEART RATE: 61 BPM

## 2021-07-01 VITALS — HEIGHT: 62 IN | DIASTOLIC BLOOD PRESSURE: 58 MMHG | SYSTOLIC BLOOD PRESSURE: 126 MMHG | HEART RATE: 59 BPM

## 2021-07-01 VITALS
DIASTOLIC BLOOD PRESSURE: 71 MMHG | HEART RATE: 64 BPM | HEIGHT: 62 IN | BODY MASS INDEX: 40.16 KG/M2 | SYSTOLIC BLOOD PRESSURE: 159 MMHG

## 2021-07-01 VITALS
HEIGHT: 62 IN | HEART RATE: 63 BPM | BODY MASS INDEX: 39.54 KG/M2 | DIASTOLIC BLOOD PRESSURE: 74 MMHG | TEMPERATURE: 97.6 F | SYSTOLIC BLOOD PRESSURE: 163 MMHG

## 2021-07-02 VITALS
BODY MASS INDEX: 38.45 KG/M2 | DIASTOLIC BLOOD PRESSURE: 58 MMHG | HEIGHT: 62 IN | HEART RATE: 63 BPM | SYSTOLIC BLOOD PRESSURE: 122 MMHG | TEMPERATURE: 97.7 F

## 2021-07-02 VITALS — HEIGHT: 62 IN | BODY MASS INDEX: 39.4 KG/M2 | TEMPERATURE: 96.5 F

## 2021-07-02 VITALS — TEMPERATURE: 98.3 F | BODY MASS INDEX: 38.45 KG/M2 | HEIGHT: 62 IN

## 2021-07-02 VITALS
BODY MASS INDEX: 38.68 KG/M2 | HEART RATE: 69 BPM | WEIGHT: 210.2 LBS | TEMPERATURE: 97.7 F | HEIGHT: 62 IN | SYSTOLIC BLOOD PRESSURE: 126 MMHG | DIASTOLIC BLOOD PRESSURE: 56 MMHG

## 2021-07-02 VITALS
HEART RATE: 83 BPM | SYSTOLIC BLOOD PRESSURE: 144 MMHG | HEIGHT: 62 IN | WEIGHT: 215.4 LBS | BODY MASS INDEX: 39.64 KG/M2 | TEMPERATURE: 97.2 F | DIASTOLIC BLOOD PRESSURE: 52 MMHG

## 2021-07-02 VITALS — BODY MASS INDEX: 38.45 KG/M2 | TEMPERATURE: 98 F | HEIGHT: 62 IN

## 2021-07-02 VITALS
DIASTOLIC BLOOD PRESSURE: 64 MMHG | BODY MASS INDEX: 38.96 KG/M2 | HEIGHT: 62 IN | SYSTOLIC BLOOD PRESSURE: 134 MMHG | HEART RATE: 63 BPM

## 2021-07-02 VITALS
DIASTOLIC BLOOD PRESSURE: 62 MMHG | BODY MASS INDEX: 38.04 KG/M2 | HEIGHT: 62 IN | HEART RATE: 60 BPM | SYSTOLIC BLOOD PRESSURE: 148 MMHG

## 2021-07-02 VITALS — TEMPERATURE: 96.9 F | BODY MASS INDEX: 39.4 KG/M2 | HEIGHT: 62 IN

## 2021-07-02 VITALS — TEMPERATURE: 98.3 F | HEIGHT: 62 IN | BODY MASS INDEX: 39.4 KG/M2

## 2021-07-02 VITALS — HEIGHT: 62 IN | TEMPERATURE: 96.7 F | BODY MASS INDEX: 39.4 KG/M2

## 2021-07-02 VITALS
WEIGHT: 208 LBS | DIASTOLIC BLOOD PRESSURE: 58 MMHG | SYSTOLIC BLOOD PRESSURE: 143 MMHG | BODY MASS INDEX: 38.28 KG/M2 | TEMPERATURE: 98.3 F | HEART RATE: 67 BPM | HEIGHT: 62 IN

## 2021-07-02 VITALS — TEMPERATURE: 98 F | BODY MASS INDEX: 38.45 KG/M2 | HEIGHT: 62 IN

## 2021-07-02 VITALS
BODY MASS INDEX: 38.04 KG/M2 | HEIGHT: 62 IN | SYSTOLIC BLOOD PRESSURE: 138 MMHG | DIASTOLIC BLOOD PRESSURE: 63 MMHG | HEART RATE: 62 BPM

## 2021-07-02 VITALS
HEART RATE: 71 BPM | TEMPERATURE: 98.5 F | BODY MASS INDEX: 38.28 KG/M2 | DIASTOLIC BLOOD PRESSURE: 41 MMHG | SYSTOLIC BLOOD PRESSURE: 127 MMHG | WEIGHT: 208 LBS | HEIGHT: 62 IN

## 2021-07-02 VITALS — BODY MASS INDEX: 39.4 KG/M2 | TEMPERATURE: 96.6 F | HEIGHT: 62 IN

## 2021-07-02 VITALS — HEIGHT: 62 IN | TEMPERATURE: 96 F | BODY MASS INDEX: 39.4 KG/M2

## 2021-07-02 VITALS
SYSTOLIC BLOOD PRESSURE: 150 MMHG | HEART RATE: 73 BPM | DIASTOLIC BLOOD PRESSURE: 63 MMHG | BODY MASS INDEX: 38.04 KG/M2 | HEIGHT: 62 IN

## 2021-07-02 VITALS
HEIGHT: 62 IN | TEMPERATURE: 98.7 F | DIASTOLIC BLOOD PRESSURE: 58 MMHG | BODY MASS INDEX: 38.45 KG/M2 | HEART RATE: 72 BPM | SYSTOLIC BLOOD PRESSURE: 139 MMHG

## 2021-07-02 VITALS — HEIGHT: 62 IN | BODY MASS INDEX: 39.4 KG/M2 | TEMPERATURE: 98.2 F

## 2021-07-16 ENCOUNTER — TELEPHONE (OUTPATIENT)
Dept: FAMILY MEDICINE CLINIC | Age: 86
End: 2021-07-16

## 2021-07-16 NOTE — TELEPHONE ENCOUNTER
Attempted to call pt son re: missed INR matias 7/2/21, unable to reach anyone or leave message on any number listed for pt/ pdr

## 2021-07-20 NOTE — TELEPHONE ENCOUNTER
Spoke with pt's son [phone]) He said that they are out of town for an emergency and will be back in town in 2 weeks and will bring pt in then to have INR done./pr

## 2021-08-05 ENCOUNTER — ANTICOAGULATION VISIT (OUTPATIENT)
Dept: FAMILY MEDICINE CLINIC | Age: 86
End: 2021-08-05

## 2021-08-05 DIAGNOSIS — I74.11 EMBOLISM AND THROMBOSIS OF THORACIC AORTA (HCC): Primary | ICD-10-CM

## 2021-08-05 LAB — INR PPP: 1.7 (ref 2–3)

## 2021-08-05 PROCEDURE — 85610 PROTHROMBIN TIME: CPT | Performed by: FAMILY MEDICINE

## 2021-08-05 PROCEDURE — 36416 COLLJ CAPILLARY BLOOD SPEC: CPT | Performed by: FAMILY MEDICINE

## 2021-08-11 DIAGNOSIS — I69.30 UNSPECIFIED SEQUELAE OF CEREBRAL INFARCTION: ICD-10-CM

## 2021-08-12 ENCOUNTER — TELEPHONE (OUTPATIENT)
Dept: FAMILY MEDICINE CLINIC | Age: 86
End: 2021-08-12

## 2021-08-12 RX ORDER — PANTOPRAZOLE SODIUM 40 MG/1
40 TABLET, DELAYED RELEASE ORAL DAILY
COMMUNITY
Start: 2021-05-15 | End: 2021-08-12

## 2021-08-12 RX ORDER — WARFARIN SODIUM 3 MG/1
TABLET ORAL
Qty: 90 TABLET | Refills: 1 | Status: SHIPPED | OUTPATIENT
Start: 2021-08-12 | End: 2022-02-15

## 2021-08-12 RX ORDER — PANTOPRAZOLE SODIUM 40 MG/1
TABLET, DELAYED RELEASE ORAL
Qty: 30 TABLET | Refills: 0 | Status: SHIPPED | OUTPATIENT
Start: 2021-08-12 | End: 2022-02-15

## 2021-08-12 RX ORDER — WARFARIN SODIUM 3 MG/1
3 TABLET ORAL DAILY
COMMUNITY
Start: 2021-05-10 | End: 2021-08-12

## 2021-08-12 NOTE — TELEPHONE ENCOUNTER
[facility] HAS CALLED, THEY JUST RECEIVED PRESCRIPTION REFILLS FROM DR. LEPE.     THE ATORVASTATIN 80MG NEEDED TO BE CHANGED FROM 30 DAY SUPPLY TO 90 DAY.     THE PHARMACY IS ALSO REQUESTING TO KNOW IF THE PATIENT IS STILL TAKING mirtazapine (REMERON) 15 MG tablet      PLEASE CALL AND ADVISE: [phone]

## 2021-08-16 RX ORDER — MIRTAZAPINE 15 MG/1
7.5 TABLET, FILM COATED ORAL
Qty: 45 TABLET | Refills: 3 | Status: SHIPPED | OUTPATIENT
Start: 2021-08-16

## 2021-08-19 ENCOUNTER — ANTICOAGULATION VISIT (OUTPATIENT)
Dept: FAMILY MEDICINE CLINIC | Age: 86
End: 2021-08-19

## 2021-08-19 VITALS — HEART RATE: 71 BPM | SYSTOLIC BLOOD PRESSURE: 126 MMHG | DIASTOLIC BLOOD PRESSURE: 61 MMHG

## 2021-08-19 DIAGNOSIS — I74.11 EMBOLISM AND THROMBOSIS OF THORACIC AORTA (HCC): Primary | ICD-10-CM

## 2021-08-19 LAB — INR PPP: 1.8 (ref 2–3)

## 2021-08-19 PROCEDURE — 85610 PROTHROMBIN TIME: CPT | Performed by: FAMILY MEDICINE

## 2021-08-19 PROCEDURE — 36416 COLLJ CAPILLARY BLOOD SPEC: CPT | Performed by: FAMILY MEDICINE

## 2021-09-10 ENCOUNTER — ANTICOAGULATION VISIT (OUTPATIENT)
Dept: FAMILY MEDICINE CLINIC | Age: 86
End: 2021-09-10

## 2021-09-10 VITALS — DIASTOLIC BLOOD PRESSURE: 62 MMHG | HEART RATE: 70 BPM | SYSTOLIC BLOOD PRESSURE: 133 MMHG

## 2021-09-10 DIAGNOSIS — I74.11 EMBOLISM AND THROMBOSIS OF THORACIC AORTA (HCC): Primary | ICD-10-CM

## 2021-09-10 LAB — INR PPP: 2.4 (ref 2–3)

## 2021-09-10 PROCEDURE — 85610 PROTHROMBIN TIME: CPT | Performed by: FAMILY MEDICINE

## 2021-09-10 PROCEDURE — 36416 COLLJ CAPILLARY BLOOD SPEC: CPT | Performed by: FAMILY MEDICINE

## 2021-10-12 ENCOUNTER — TELEPHONE (OUTPATIENT)
Dept: FAMILY MEDICINE CLINIC | Age: 86
End: 2021-10-12

## 2021-10-26 ENCOUNTER — CLINICAL SUPPORT (OUTPATIENT)
Dept: FAMILY MEDICINE CLINIC | Age: 86
End: 2021-10-26

## 2021-10-26 DIAGNOSIS — I74.11 EMBOLISM AND THROMBOSIS OF THORACIC AORTA (HCC): Primary | ICD-10-CM

## 2021-10-26 LAB — INR PPP: 2.2 (ref 2–3)

## 2021-10-26 PROCEDURE — 85610 PROTHROMBIN TIME: CPT | Performed by: FAMILY MEDICINE

## 2021-10-26 PROCEDURE — 36416 COLLJ CAPILLARY BLOOD SPEC: CPT | Performed by: FAMILY MEDICINE

## 2021-11-09 RX ORDER — ATORVASTATIN CALCIUM 80 MG/1
TABLET, FILM COATED ORAL
Qty: 30 TABLET | Refills: 0 | OUTPATIENT
Start: 2021-11-09

## 2021-11-23 ENCOUNTER — ANTICOAGULATION VISIT (OUTPATIENT)
Dept: FAMILY MEDICINE CLINIC | Age: 86
End: 2021-11-23

## 2021-11-23 DIAGNOSIS — I74.11 EMBOLISM AND THROMBOSIS OF THORACIC AORTA (HCC): Primary | ICD-10-CM

## 2021-11-23 LAB — INR PPP: 2 (ref 2–3)

## 2021-11-23 PROCEDURE — 85610 PROTHROMBIN TIME: CPT | Performed by: FAMILY MEDICINE

## 2021-11-23 PROCEDURE — 36416 COLLJ CAPILLARY BLOOD SPEC: CPT | Performed by: FAMILY MEDICINE

## 2022-01-28 ENCOUNTER — TELEPHONE (OUTPATIENT)
Dept: FAMILY MEDICINE CLINIC | Age: 87
End: 2022-01-28

## 2022-01-28 NOTE — TELEPHONE ENCOUNTER
Caller: NEGAR HARRIS    Relationship: Child    Best call back number: [phone]    Additional notes: PATIENT'S SON, NEGAR HARRIS, CALLED TO LET US KNOW THAT PATIENT IS ATTEMPTING TO GET HER BOOSTER VACCINE FOR COVID, BUT SHE HAS LOST THE VACCINATION CARD AND PATIENT AND PATIENT'S SON IS UNSURE WHERE THEY COULD POSSIBLY GET ANOTHER VACCINATION CARD. PATIENT HAD BOTH OF THE COVID VACCINES IN [place] AT A SHOPPING CENTER. HE IS REQUESTING A CALL WITH A POSSIBLE LOCATION OR WHAT HE NEEDS TO DO IN ORDER TO GET THAT CARD SO SHE CAN GET HER BOOSTER VACCINE AS SOON AS POSSIBLE.

## 2022-01-28 NOTE — TELEPHONE ENCOUNTER
Spoke to pt son, pt in [place] and lost her vaccine card and cannot fly back without it, will print off record and leave at  for Juve to / pdr

## 2022-02-05 DIAGNOSIS — I69.30 UNSPECIFIED SEQUELAE OF CEREBRAL INFARCTION: ICD-10-CM

## 2022-02-07 NOTE — TELEPHONE ENCOUNTER
Rx Refill Note  Requested Prescriptions     Pending Prescriptions Disp Refills   • pantoprazole (PROTONIX) 40 MG EC tablet [Pharmacy Med Name: pantoprazole 40 mg tablet,delayed release] 30 tablet 1     Sig: TAKE ONE TABLET BY MOUTH EVERY DAY   • atorvastatin (LIPITOR) 80 MG tablet [Pharmacy Med Name: atorvastatin 80 mg tablet] 30 tablet 1     Sig: TAKE ONE TABLET BY MOUTH EVERY NIGHT AT BEDTIME      Last office visit with prescribing clinician: 12/22/2020      Next office visit with prescribing clinician: none   Lab: Lipid Panel (01/27/2020 10:13)    Unable to reach pt, no vm set up. Pt has not been seen by Dr Means since 12/22/2020. She needs an appt. Called [facility] and left a message.     Claudia Degroot LPN  02/07/22, 14:39 EST

## 2022-02-07 NOTE — TELEPHONE ENCOUNTER
Rx Refill Note  Requested Prescriptions     Pending Prescriptions Disp Refills   • warfarin (COUMADIN) 3 MG tablet [Pharmacy Med Name: warfarin 3 mg tablet] 90 tablet 1     Sig: TAKE ONE TABLET BY MOUTH EVERY DAY      Last office visit with prescribing clinician:  12/22/20  Next office visit with prescribing clinician: none   Lab: POC INR (11/23/2021)  Unable to reach pt, no vm set up. Pt has not been seen by Dr Means since 12/22/2020. She needs an appt. Called [facility] and left a message.     Claudia Degroot LPN  02/07/22, 14:32 EST

## 2022-02-15 RX ORDER — WARFARIN SODIUM 3 MG/1
TABLET ORAL
Qty: 90 TABLET | Refills: 1 | Status: SHIPPED | OUTPATIENT
Start: 2022-02-15

## 2022-02-15 RX ORDER — PANTOPRAZOLE SODIUM 40 MG/1
TABLET, DELAYED RELEASE ORAL
Qty: 30 TABLET | Refills: 1 | Status: SHIPPED | OUTPATIENT
Start: 2022-02-15

## 2022-02-15 RX ORDER — ATORVASTATIN CALCIUM 80 MG/1
TABLET, FILM COATED ORAL
Qty: 30 TABLET | Refills: 1 | Status: SHIPPED | OUTPATIENT
Start: 2022-02-15

## 2022-09-21 ENCOUNTER — TELEPHONE (OUTPATIENT)
Dept: FAMILY MEDICINE CLINIC | Age: 87
End: 2022-09-21

## 2022-09-21 NOTE — TELEPHONE ENCOUNTER
Called and spoke with pt's son Juve regards pt INR that hasn't been done since 11/2021. Pt is still currently in [place] with her son and having issue with their visa getting back to the states. Pt will contact our office once they get back into the states./pr

## 2023-01-12 ENCOUNTER — ANTICOAGULATION VISIT (OUTPATIENT)
Dept: FAMILY MEDICINE CLINIC | Age: 88
End: 2023-01-12
Payer: MEDICARE

## 2023-01-12 DIAGNOSIS — I74.11 EMBOLISM AND THROMBOSIS OF THORACIC AORTA: Primary | ICD-10-CM

## 2025-02-25 ENCOUNTER — LAB (OUTPATIENT)
Dept: LAB | Facility: HOSPITAL | Age: OVER 89
End: 2025-02-25
Payer: MEDICARE

## 2025-02-25 ENCOUNTER — HOSPITAL ENCOUNTER (OUTPATIENT)
Dept: GENERAL RADIOLOGY | Facility: HOSPITAL | Age: OVER 89
Discharge: HOME | End: 2025-02-25
Payer: MEDICARE

## 2025-02-25 ENCOUNTER — OFFICE VISIT (OUTPATIENT)
Dept: FAMILY MEDICINE CLINIC | Age: OVER 89
End: 2025-02-25
Payer: MEDICARE

## 2025-02-25 VITALS
DIASTOLIC BLOOD PRESSURE: 47 MMHG | SYSTOLIC BLOOD PRESSURE: 126 MMHG | TEMPERATURE: 98 F | BODY MASS INDEX: 36.03 KG/M2 | OXYGEN SATURATION: 97 % | HEIGHT: 62 IN | WEIGHT: 195.8 LBS | HEART RATE: 74 BPM

## 2025-02-25 DIAGNOSIS — Z13.6 SCREENING FOR CARDIOVASCULAR CONDITION: ICD-10-CM

## 2025-02-25 DIAGNOSIS — M25.562 CHRONIC PAIN OF LEFT KNEE: ICD-10-CM

## 2025-02-25 DIAGNOSIS — G89.29 CHRONIC PAIN OF LEFT KNEE: ICD-10-CM

## 2025-02-25 DIAGNOSIS — F34.1 DYSTHYMIC DISORDER: ICD-10-CM

## 2025-02-25 DIAGNOSIS — Z79.01 CURRENT USE OF LONG TERM ANTICOAGULATION: ICD-10-CM

## 2025-02-25 DIAGNOSIS — R60.0 LOCALIZED EDEMA: ICD-10-CM

## 2025-02-25 DIAGNOSIS — I63.9 CEREBROVASCULAR ACCIDENT (CVA), UNSPECIFIED MECHANISM: ICD-10-CM

## 2025-02-25 DIAGNOSIS — I63.9 CEREBROVASCULAR ACCIDENT (CVA), UNSPECIFIED MECHANISM: Primary | ICD-10-CM

## 2025-02-25 DIAGNOSIS — I74.10 THROMBUS OF AORTA: ICD-10-CM

## 2025-02-25 DIAGNOSIS — M54.2 NECK PAIN: ICD-10-CM

## 2025-02-25 PROBLEM — Q21.12 PFO (PATENT FORAMEN OVALE): Status: ACTIVE | Noted: 2025-02-25

## 2025-02-25 PROBLEM — M19.90 OSTEOARTHRITIS: Status: ACTIVE | Noted: 2025-02-25

## 2025-02-25 PROBLEM — F32.A DEPRESSIVE DISORDER: Status: ACTIVE | Noted: 2025-02-25

## 2025-02-25 PROBLEM — E55.9 VITAMIN D DEFICIENCY: Status: ACTIVE | Noted: 2025-02-25

## 2025-02-25 LAB
ALBUMIN SERPL-MCNC: 3.4 G/DL (ref 3.5–5.2)
ALBUMIN/GLOB SERPL: 1 G/DL
ALP SERPL-CCNC: 176 U/L (ref 39–117)
ALT SERPL W P-5'-P-CCNC: 13 U/L (ref 1–33)
ANION GAP SERPL CALCULATED.3IONS-SCNC: 5.6 MMOL/L (ref 5–15)
AST SERPL-CCNC: 23 U/L (ref 1–32)
BILIRUB SERPL-MCNC: 0.3 MG/DL (ref 0–1.2)
BUN SERPL-MCNC: 35 MG/DL (ref 8–23)
BUN/CREAT SERPL: 26.5 (ref 7–25)
CALCIUM SPEC-SCNC: 9.4 MG/DL (ref 8.6–10.5)
CHLORIDE SERPL-SCNC: 106 MMOL/L (ref 98–107)
CHOLEST SERPL-MCNC: 122 MG/DL (ref 0–200)
CO2 SERPL-SCNC: 26.4 MMOL/L (ref 22–29)
CREAT SERPL-MCNC: 1.32 MG/DL (ref 0.57–1)
DEPRECATED RDW RBC AUTO: 51.1 FL (ref 37–54)
EGFRCR SERPLBLD CKD-EPI 2021: 38.7 ML/MIN/1.73
ERYTHROCYTE [DISTWIDTH] IN BLOOD BY AUTOMATED COUNT: 16.3 % (ref 12.3–15.4)
GLOBULIN UR ELPH-MCNC: 3.4 GM/DL
GLUCOSE SERPL-MCNC: 96 MG/DL (ref 65–99)
HCT VFR BLD AUTO: 38.4 % (ref 34–46.6)
HDLC SERPL-MCNC: 48 MG/DL (ref 40–60)
HGB BLD-MCNC: 11.5 G/DL (ref 12–15.9)
INR PPP: 1.29 (ref 0.86–1.15)
LDLC SERPL CALC-MCNC: 54 MG/DL (ref 0–100)
LDLC/HDLC SERPL: 1.08 {RATIO}
MCH RBC QN AUTO: 25.5 PG (ref 26.6–33)
MCHC RBC AUTO-ENTMCNC: 29.9 G/DL (ref 31.5–35.7)
MCV RBC AUTO: 85.1 FL (ref 79–97)
PLATELET # BLD AUTO: 223 10*3/MM3 (ref 140–450)
PMV BLD AUTO: 10.2 FL (ref 6–12)
POTASSIUM SERPL-SCNC: 4.9 MMOL/L (ref 3.5–5.2)
PROT SERPL-MCNC: 6.8 G/DL (ref 6–8.5)
PROTHROMBIN TIME: 16.7 SECONDS (ref 11.8–14.9)
RBC # BLD AUTO: 4.51 10*6/MM3 (ref 3.77–5.28)
SODIUM SERPL-SCNC: 138 MMOL/L (ref 136–145)
TRIGL SERPL-MCNC: 111 MG/DL (ref 0–150)
VLDLC SERPL-MCNC: 20 MG/DL (ref 5–40)
WBC NRBC COR # BLD AUTO: 6.16 10*3/MM3 (ref 3.4–10.8)

## 2025-02-25 PROCEDURE — 80061 LIPID PANEL: CPT

## 2025-02-25 PROCEDURE — 80053 COMPREHEN METABOLIC PANEL: CPT

## 2025-02-25 PROCEDURE — 73562 X-RAY EXAM OF KNEE 3: CPT

## 2025-02-25 PROCEDURE — 36415 COLL VENOUS BLD VENIPUNCTURE: CPT

## 2025-02-25 PROCEDURE — 85027 COMPLETE CBC AUTOMATED: CPT

## 2025-02-25 PROCEDURE — 85610 PROTHROMBIN TIME: CPT

## 2025-02-25 PROCEDURE — 99204 OFFICE O/P NEW MOD 45 MIN: CPT | Performed by: NURSE PRACTITIONER

## 2025-02-25 RX ORDER — FUROSEMIDE 20 MG/1
20 TABLET ORAL DAILY
Qty: 90 TABLET | Refills: 1 | Status: SHIPPED | OUTPATIENT
Start: 2025-02-25

## 2025-02-25 RX ORDER — ALBUTEROL SULFATE 90 UG/1
INHALANT RESPIRATORY (INHALATION)
COMMUNITY
End: 2025-02-25

## 2025-02-25 RX ORDER — SPIRONOLACTONE 25 MG/1
25 TABLET ORAL DAILY
Qty: 90 TABLET | Refills: 1 | Status: SHIPPED | OUTPATIENT
Start: 2025-02-25

## 2025-02-25 NOTE — PROGRESS NOTES
Chief Complaint  Ping Moore presents to [facility] for Establish Care (Pt is here to Re-establish care ) and COPD (Follow up )    Subjective     History of Present Illness    Ms. Rodriguez is here with her family (son and daughter in law) with the help of  to re-establish care   previous patient of Dr. Means - last visit 2022 .History is provided mostly by son and daughter - in-law as her son, Juve, reports that she will have confused conversation at times after her stroke.    She left [place] and went back to [place] and is needing refills on her medication.  She has a history of long term warfarin use secondary to cerebral infarction sequelae (has been taking 1/4 tablet of Sintrom (warfarin equivalent ) , COPD with use of PRN inhaler, hyperlipidemia taking  atorvastatin She was put on sertraline in [place] for anxiety / depression as well as furosemide 20mg and spironolactone 25 mg combo ([id number]).  Her son Juve states that her INR has been stable and no medication changes have been made of recent.      She is having pain in her left knee.  This has been an ongoing problem.  She had a knee replacement years ago and was complicated by an infection resulting in skin graft.  This causes her a great deal of pain.  She has fallen while she was in [place] but unable to determine if she injured herself.  She also reports that her head has been hurting with movement.  She states it is radiating around the back of her head and down into her neck.  She is a poor historian upon questioning and son is unable to provide much information regarding her pain, length of time present .  He denies any cognitive changes, change in gait outside of her right side weakness or any            Assessment and Plan     Diagnoses and all orders for this visit:    1. Cerebrovascular accident (CVA), unspecified mechanism (Primary)  -     Protime-INR; Future    2. Current use of long  term anticoagulation  -     Protime-INR; Future  -     CBC No Differential; Future    3. Thrombus of aortic arch  Comments:  Family is requesting evaluation to determine if need for continued use of Warfarin- Will get her in with specialty for further recommendations / risk eval    4. Dysthymic disorder  Comments:  continue current treatment   f/u 6 months  Orders:  -     sertraline (Zoloft) 50 MG tablet; Take 1 tablet by mouth Daily.  Dispense: 90 tablet; Refill: 1    5. Neck pain  Comments:  we will get her scheduled with PT (prefers Home PT)  Orders:  -     Ambulatory Referral to Home Health    6. Chronic pain of left knee  Comments:  refer to PT  Orders:  -     XR Knee 3 View Left; Future  -     Ambulatory Referral to Home Health    7. Localized edema  Comments:  continue current treatment as previously prescribed in [place] equivalant  Orders:  -     furosemide (Lasix) 20 MG tablet; Take 1 tablet by mouth Daily.  Dispense: 90 tablet; Refill: 1  -     spironolactone (Aldactone) 25 MG tablet; Take 1 tablet by mouth Daily.  Dispense: 90 tablet; Refill: 1    8. Screening for cardiovascular condition  -     Comprehensive metabolic panel; Future  -     Lipid panel; Future  -     CBC No Differential; Future            Follow Up   No follow-ups on file.  No future appointments.    New Medications Ordered This Visit   Medications    sertraline (Zoloft) 50 MG tablet     Sig: Take 1 tablet by mouth Daily.     Dispense:  90 tablet     Refill:  1    furosemide (Lasix) 20 MG tablet     Sig: Take 1 tablet by mouth Daily.     Dispense:  90 tablet     Refill:  1    spironolactone (Aldactone) 25 MG tablet     Sig: Take 1 tablet by mouth Daily.     Dispense:  90 tablet     Refill:  1       Medications Discontinued During This Encounter   Medication Reason    warfarin (COUMADIN) 3 MG tablet Alternate therapy    albuterol sulfate HFA (Ventolin HFA) 108 (90 Base) MCG/ACT inhaler *Therapy completed    mirtazapine (REMERON) 15 MG  "tablet *Therapy completed    pantoprazole (PROTONIX) 40 MG EC tablet *Therapy completed          Review of Systems    Objective     Vitals:    02/25/25 0959   BP: 126/47   BP Location: Left arm   Patient Position: Sitting   Cuff Size: Adult   Pulse: 74   Temp: 98 °F (36.7 °C)   TempSrc: Oral   SpO2: 97%   Weight: 88.8 kg (195 lb 12.8 oz)   Height: 157.5 cm (62.01\")         Physical Exam  Constitutional:       General: She is not in acute distress.     Appearance: Normal appearance.   HENT:      Head: Normocephalic.   Cardiovascular:      Rate and Rhythm: Normal rate and regular rhythm.   Pulmonary:      Effort: Pulmonary effort is normal.      Breath sounds: Normal breath sounds.   Musculoskeletal:         General: Normal range of motion.      Left knee: Swelling present. Decreased range of motion. Tenderness present.   Neurological:      General: No focal deficit present.      Mental Status: She is alert and oriented to person, place, and time. She is confused.   Psychiatric:         Mood and Affect: Mood normal.         Behavior: Behavior normal.               Result Review          Allergies   Allergen Reactions    Latex Rash    Naproxen Rash     and nausea    Lisinopril Unknown - Low Severity      Past Medical History:   Diagnosis Date    Allergic conjunctivitis     Arthritis     COPD (chronic obstructive pulmonary disease)     Current use of long term anticoagulation     Dysthymic disorder     GERD (gastroesophageal reflux disease)     Localized edema     Pure hypercholesterolemia     Stroke 2016    Thrombus of aortic arch     Urinary incontinence      Current Outpatient Medications   Medication Sig Dispense Refill    atorvastatin (LIPITOR) 80 MG tablet TAKE ONE TABLET BY MOUTH EVERY NIGHT AT BEDTIME 30 tablet 1    furosemide (Lasix) 20 MG tablet Take 1 tablet by mouth Daily. 90 tablet 1    sertraline (Zoloft) 50 MG tablet Take 1 tablet by mouth Daily. 90 tablet 1    spironolactone (Aldactone) 25 MG tablet Take " 1 tablet by mouth Daily. 90 tablet 1     No current facility-administered medications for this visit.     Past Surgical History:   Procedure Laterality Date    CATARACT EXTRACTION, BILATERAL      COLOSTOMY      WITH REVERSAL    HYSTERECTOMY      JOINT REPLACEMENT Left     REPLACEMENT TOTAL KNEE Left       Health Maintenance Due   Topic Date Due    DXA SCAN  Never done    BMI FOLLOWUP  Never done    Pneumococcal Vaccine 50+ (1 of 2 - PCV) Never done    TDAP/TD VACCINES (1 - Tdap) Never done    ZOSTER VACCINE (1 of 2) Never done    RSV Vaccine - Adults (1 - 1-dose 75+ series) Never done    LIPID PANEL  01/27/2021    ANNUAL WELLNESS VISIT  06/23/2021    INFLUENZA VACCINE  07/01/2024    COVID-19 Vaccine (3 - 2024-25 season) 09/01/2024      Immunization History   Administered Date(s) Administered    COVID-19 (MODERNA) 1st,2nd,3rd Dose Monovalent 02/24/2021, 03/25/2021    Fluzone High-Dose 65+YRS 01/08/2018         Part of this note may be an electronic transcription/translation of spoken language to printed   text using the Dragon Dictation System.      TIM Ramirez

## 2025-02-27 DIAGNOSIS — Z79.01 CURRENT USE OF LONG TERM ANTICOAGULATION: Primary | ICD-10-CM

## 2025-02-27 DIAGNOSIS — I74.10 THROMBUS OF AORTA: ICD-10-CM

## 2025-02-27 DIAGNOSIS — I63.9 CEREBROVASCULAR ACCIDENT (CVA), UNSPECIFIED MECHANISM: ICD-10-CM

## 2025-02-27 RX ORDER — WARFARIN SODIUM 2 MG/1
2 TABLET ORAL NIGHTLY
Qty: 30 TABLET | Refills: 1 | Status: SHIPPED | OUTPATIENT
Start: 2025-02-27

## 2025-03-03 ENCOUNTER — OFFICE VISIT (OUTPATIENT)
Dept: FAMILY MEDICINE CLINIC | Age: OVER 89
End: 2025-03-03
Payer: MEDICARE

## 2025-03-03 VITALS
HEART RATE: 61 BPM | OXYGEN SATURATION: 97 % | SYSTOLIC BLOOD PRESSURE: 138 MMHG | BODY MASS INDEX: 36.14 KG/M2 | HEIGHT: 62 IN | DIASTOLIC BLOOD PRESSURE: 76 MMHG | WEIGHT: 196.4 LBS | TEMPERATURE: 97.9 F

## 2025-03-03 DIAGNOSIS — M15.9 GENERALIZED OSTEOARTHRITIS: ICD-10-CM

## 2025-03-03 DIAGNOSIS — E78.00 HIGH CHOLESTEROL: Primary | ICD-10-CM

## 2025-03-03 DIAGNOSIS — R53.1 GENERALIZED WEAKNESS: ICD-10-CM

## 2025-03-03 DIAGNOSIS — K21.9 GERD WITHOUT ESOPHAGITIS: ICD-10-CM

## 2025-03-03 DIAGNOSIS — I63.9 CEREBROVASCULAR ACCIDENT (CVA), UNSPECIFIED MECHANISM: ICD-10-CM

## 2025-03-03 PROBLEM — F34.1 DYSTHYMIC DISORDER: Status: ACTIVE | Noted: 2025-02-25

## 2025-03-03 PROBLEM — E55.9 VITAMIN D DEFICIENCY: Status: RESOLVED | Noted: 2025-02-25 | Resolved: 2025-03-03

## 2025-03-03 PROBLEM — Q21.12 PFO (PATENT FORAMEN OVALE): Status: RESOLVED | Noted: 2025-02-25 | Resolved: 2025-03-03

## 2025-03-03 LAB — INR PPP: 8 (ref 0.9–1.1)

## 2025-03-03 PROCEDURE — 1159F MED LIST DOCD IN RCRD: CPT | Performed by: FAMILY MEDICINE

## 2025-03-03 PROCEDURE — 1160F RVW MEDS BY RX/DR IN RCRD: CPT | Performed by: FAMILY MEDICINE

## 2025-03-03 PROCEDURE — 99214 OFFICE O/P EST MOD 30 MIN: CPT | Performed by: FAMILY MEDICINE

## 2025-03-03 PROCEDURE — 85610 PROTHROMBIN TIME: CPT | Performed by: FAMILY MEDICINE

## 2025-03-03 PROCEDURE — 36416 COLLJ CAPILLARY BLOOD SPEC: CPT | Performed by: FAMILY MEDICINE

## 2025-03-03 RX ORDER — ATORVASTATIN CALCIUM 80 MG/1
80 TABLET, FILM COATED ORAL
Qty: 90 TABLET | Refills: 1 | Status: SHIPPED | OUTPATIENT
Start: 2025-03-03

## 2025-03-03 NOTE — PROGRESS NOTES
Chief Complaint  Establish Care (Re establish care, accompanied by son Juve) and Cerebrovascular Accident (Follow up)    Subjective          Ping Moore presents to [facility]  History of Present Illness  --RECENT LIPIDS WEE OK, NO ISSUES WITH THE STATIN  --GERD IS WELL CONTROLLED WITH THE PPI  --OA OF LEFT KNEE, ACHES AND SWELLS AT TIMES        Allergies   Allergen Reactions    Latex Rash    Naproxen Rash     and nausea    Lisinopril Unknown - Low Severity        Health Maintenance Due   Topic Date Due    DXA SCAN  Never done    BMI FOLLOWUP  Never done    Pneumococcal Vaccine 50+ (1 of 1 - PCV) Never done    ANNUAL WELLNESS VISIT  06/23/2021    INFLUENZA VACCINE  07/01/2024    COVID-19 Vaccine (3 - 2024-25 season) 09/01/2024        Current Outpatient Medications on File Prior to Visit   Medication Sig    furosemide (Lasix) 20 MG tablet Take 1 tablet by mouth Daily.    sertraline (Zoloft) 50 MG tablet Take 1 tablet by mouth Daily.    spironolactone (Aldactone) 25 MG tablet Take 1 tablet by mouth Daily.    warfarin (COUMADIN) 2 MG tablet Take 1 tablet by mouth Every Night.    [DISCONTINUED] atorvastatin (LIPITOR) 80 MG tablet TAKE ONE TABLET BY MOUTH EVERY NIGHT AT BEDTIME (Patient not taking: Reported on 3/3/2025)     No current facility-administered medications on file prior to visit.       Immunization History   Administered Date(s) Administered    COVID-19 (MODERNA) 1st,2nd,3rd Dose Monovalent 02/24/2021, 03/25/2021    Fluzone High-Dose 65+YRS 01/08/2018       Review of Systems   Constitutional:  Positive for fatigue. Negative for activity change, appetite change, chills and fever.   HENT:  Negative for congestion, ear pain, rhinorrhea and sore throat.    Respiratory:  Negative for cough and shortness of breath.    Cardiovascular:  Negative for chest pain, palpitations and leg swelling.   Gastrointestinal:  Negative for abdominal pain, constipation, diarrhea, nausea  "and vomiting.   Musculoskeletal:  Positive for arthralgias. Negative for myalgias.   Neurological:  Negative for headache.        Objective     /76 (BP Location: Right arm, Patient Position: Sitting)   Pulse 61   Temp 97.9 °F (36.6 °C) (Oral)   Ht 157.5 cm (62\")   Wt 89.1 kg (196 lb 6.4 oz)   SpO2 97% Comment: on room air  BMI 35.92 kg/m²       Physical Exam  Vitals and nursing note reviewed.   Constitutional:       General: She is not in acute distress.     Appearance: Normal appearance.   Cardiovascular:      Rate and Rhythm: Normal rate and regular rhythm.      Heart sounds: Normal heart sounds. No murmur heard.  Pulmonary:      Effort: Pulmonary effort is normal.      Breath sounds: Normal breath sounds.   Abdominal:      Palpations: Abdomen is soft.      Tenderness: There is no abdominal tenderness.   Musculoskeletal:      Cervical back: Neck supple.      Right lower leg: No edema.      Left lower leg: No edema.      Comments: LEFT KNEE WITH DECREASED ROM AND A SMALL AMOUNT OF EFFUSION    Lymphadenopathy:      Cervical: No cervical adenopathy.   Neurological:      General: No focal deficit present.      Mental Status: She is alert.      Cranial Nerves: No cranial nerve deficit.      Coordination: Coordination normal.      Gait: Gait normal.   Psychiatric:         Mood and Affect: Mood normal.         Behavior: Behavior normal.         Result Review :                             Assessment and Plan      Diagnoses and all orders for this visit:    1. High cholesterol (Primary)  Assessment & Plan:   Lipid abnormalities are stable    Plan:  Continue same medication/s without change.      Discussed medication dosage, use, side effects, and goals of treatment in detail.    Counseled patient on lifestyle modifications to help control hyperlipidemia.     Patient Treatment Goals:   LDL goal is less than 70    Followup in 6 months.    Orders:  -     atorvastatin (LIPITOR) 80 MG tablet; Take 1 tablet by mouth " every night at bedtime.  Dispense: 90 tablet; Refill: 1    2. Cerebrovascular accident (CVA), unspecified mechanism  -     POC INR    3. Generalized osteoarthritis  Assessment & Plan:  ADVISED SHE CAN ONLY TAKE OTC TYLENOL FOR NOW       4. GERD without esophagitis  Assessment & Plan:  IMPROVED WITH CURRENT TREATMENT, WILL REEVALUATE AT NEXT VISIT       5. Generalized weakness  Comments:  WE WILL GET HOME HEALTH INVOLVED FOR A P.T. / O.T. EVAL AND TO HELP WITH THE INR'S.        Class 2 Severe Obesity (BMI >=35 and <=39.9). Obesity-related health conditions include the following: hypertension. Obesity is unchanged. BMI is is above average; BMI management plan is completed. We discussed portion control and increasing exercise.           Follow Up     Return in about 3 months (around 6/3/2025).    Patient was given instructions and counseling regarding her condition or for health maintenance advice. Please see specific information pulled into the AVS if appropriate.

## 2025-03-04 ENCOUNTER — TELEPHONE (OUTPATIENT)
Dept: FAMILY MEDICINE CLINIC | Age: OVER 89
End: 2025-03-04
Payer: MEDICARE

## 2025-03-04 DIAGNOSIS — E78.00 HIGH CHOLESTEROL: ICD-10-CM

## 2025-03-04 DIAGNOSIS — R32 URINARY INCONTINENCE, UNSPECIFIED TYPE: ICD-10-CM

## 2025-03-04 DIAGNOSIS — Z79.01 CURRENT USE OF LONG TERM ANTICOAGULATION: ICD-10-CM

## 2025-03-04 DIAGNOSIS — I74.10 THROMBUS OF AORTA: ICD-10-CM

## 2025-03-04 DIAGNOSIS — I87.2 VENOUS INSUFFICIENCY: ICD-10-CM

## 2025-03-04 DIAGNOSIS — Z86.73 HISTORY OF STROKE: ICD-10-CM

## 2025-03-04 DIAGNOSIS — K21.9 GERD WITHOUT ESOPHAGITIS: ICD-10-CM

## 2025-03-04 DIAGNOSIS — F34.1 DYSTHYMIC DISORDER: ICD-10-CM

## 2025-03-04 DIAGNOSIS — M15.9 GENERALIZED OSTEOARTHRITIS: Primary | ICD-10-CM

## 2025-03-04 NOTE — TELEPHONE ENCOUNTER
----- Message from Ricci Means sent at 3/3/2025  5:26 PM EST -----  PLEASE SEE IF HOME HEALTH CAN DO AN EVAL ON THIS PATIENT FOR GENERALIZED WEAKNESS AND TO SEE IF THEY CAN DO HER INR'S AT HOME.  THANKS

## 2025-03-07 ENCOUNTER — TELEPHONE (OUTPATIENT)
Dept: FAMILY MEDICINE CLINIC | Age: OVER 89
End: 2025-03-07
Payer: MEDICARE

## 2025-03-07 NOTE — TELEPHONE ENCOUNTER
Spoke to Suzette with [facility], they did a start of care on her today. They are due to see her next week again.  Dr Means would like them to take over doing her INR's.  She said that would be fine, they will go out next week.

## 2025-03-11 ENCOUNTER — ANTICOAGULATION VISIT (OUTPATIENT)
Dept: FAMILY MEDICINE CLINIC | Age: OVER 89
End: 2025-03-11
Payer: MEDICARE

## 2025-03-11 ENCOUNTER — TELEPHONE (OUTPATIENT)
Dept: FAMILY MEDICINE CLINIC | Age: OVER 89
End: 2025-03-11
Payer: MEDICARE

## 2025-03-11 DIAGNOSIS — Z86.73 HISTORY OF STROKE: Primary | ICD-10-CM

## 2025-03-11 LAB — INR PPP: 2.5 (ref 2–3)

## 2025-03-11 NOTE — TELEPHONE ENCOUNTER
Nichol with [facility] called and wants to get verbal orders for OT with this patient.

## 2025-03-11 NOTE — PROGRESS NOTES
Jad with home health reported INR today 2.5. she usually takes 2mg daily but has held it the last week due to blood being too thin. Dr. Means is out of office today and it was sent to on call Dr. Huffman. She said no change and recheck in 2 weeks.

## 2025-03-18 ENCOUNTER — OUTSIDE FACILITY SERVICE (OUTPATIENT)
Dept: FAMILY MEDICINE CLINIC | Age: OVER 89
End: 2025-03-18
Payer: MEDICARE

## 2025-03-25 ENCOUNTER — ANTICOAGULATION VISIT (OUTPATIENT)
Dept: FAMILY MEDICINE CLINIC | Age: OVER 89
End: 2025-03-25
Payer: MEDICARE

## 2025-03-25 DIAGNOSIS — Z86.73 HISTORY OF STROKE: Primary | ICD-10-CM

## 2025-03-25 LAB — INR PPP: 2.9 (ref 2–3)

## 2025-03-25 NOTE — PROGRESS NOTES
Dr Soto said to continue the coumadin 2mg one at night and repeat INR in 2 weeks.   Juve informed and Jad with home health informed.

## 2025-03-25 NOTE — PROGRESS NOTES
Called son Juve and ask if his mother is taking coumadin 2mg?  He talked to her and said yes she has been taking it every night.  She was taking a mexican blood thinner called cody but was told to stop that and take the coumadin instead.

## 2025-03-25 NOTE — PROGRESS NOTES
Pt was previously taking 2mg daily of coumadin.  On 03/03/25 INR- 8.0 told to hold coumadin and recheck on 03/11/25.  On 03/11/25  INR- 2.5. Advised to continue to hold coumadin and recheck on 03/25/25.  Today 03/25/25 INR- 2.9 not taking any coumadin.   Dx: hx of stroke  Dr Means is out of the office until 03/31/25.  Sent to on call provider Dr Soto.

## 2025-04-08 ENCOUNTER — ANTICOAGULATION VISIT (OUTPATIENT)
Dept: FAMILY MEDICINE CLINIC | Age: OVER 89
End: 2025-04-08
Payer: MEDICARE

## 2025-04-08 DIAGNOSIS — Z86.73 HISTORY OF STROKE: Primary | ICD-10-CM

## 2025-04-08 LAB — INR PPP: 1.9 (ref 2–3)

## 2025-04-17 ENCOUNTER — ANTICOAGULATION VISIT (OUTPATIENT)
Dept: FAMILY MEDICINE CLINIC | Age: OVER 89
End: 2025-04-17
Payer: MEDICARE

## 2025-04-17 DIAGNOSIS — Z86.73 HISTORY OF STROKE: Primary | ICD-10-CM

## 2025-04-17 LAB — INR PPP: 2.3 (ref 2–3)

## 2025-04-17 NOTE — PROGRESS NOTES
Jad with [facility] reported her INR- 2.3.  Takes coumadin 2mg daily.   She said the pt said she stays tired all the time and wanted to know if Dr Means would recommend a vitamin for her to take.

## 2025-04-17 NOTE — PROGRESS NOTES
Dr Means said no change in coumadin dose, recheck in one month.  She can take an OTC multi vitamin, she can check with her pharmacist.   Jad faye.

## 2025-04-24 ENCOUNTER — TELEPHONE (OUTPATIENT)
Dept: FAMILY MEDICINE CLINIC | Age: OVER 89
End: 2025-04-24
Payer: MEDICARE

## 2025-04-24 DIAGNOSIS — D22.9 SKIN MOLE: Primary | ICD-10-CM

## 2025-04-24 NOTE — TELEPHONE ENCOUNTER
Jad with [facility] called to report a mole on her middle to left side of her back. Its not red or irritated, but pt said her clothes rub it causing it to stay sore.  She said its been on her back for as long as she can remember.  Nurse reports it as small, less than 1 cm.  Asking for a derm referral.

## 2025-05-01 DIAGNOSIS — Z79.01 CURRENT USE OF LONG TERM ANTICOAGULATION: ICD-10-CM

## 2025-05-01 DIAGNOSIS — L60.8 TOENAIL DEFORMITY: Primary | ICD-10-CM

## 2025-05-01 DIAGNOSIS — I63.9 CEREBROVASCULAR ACCIDENT (CVA), UNSPECIFIED MECHANISM: ICD-10-CM

## 2025-05-01 DIAGNOSIS — I74.10 THROMBUS OF AORTA: ICD-10-CM

## 2025-05-01 RX ORDER — WARFARIN SODIUM 2 MG/1
2 TABLET ORAL NIGHTLY
Qty: 90 TABLET | Refills: 1 | Status: SHIPPED | OUTPATIENT
Start: 2025-05-01

## 2025-05-01 NOTE — TELEPHONE ENCOUNTER
Caller: WES Dominguez RN [facility]    Relationship:     Best call back number: [phone]     Requested Prescriptions:   Requested Prescriptions     Pending Prescriptions Disp Refills    warfarin (COUMADIN) 2 MG tablet 30 tablet 1     Sig: Take 1 tablet by mouth Every Night.        Pharmacy where request should be sent: [facility] [address] [facility] [place] [address]  - [phone] [facility] [phone] FX     Last office visit with prescribing clinician: 3/3/2025   Last telemedicine visit with prescribing clinician: Visit date not found   Next office visit with prescribing clinician: 6/2/2025     Additional details provided by patient: LESS THAN THREE DAY SUPPLY ON HAND.     Does the patient have less than a 3 day supply:  [x] Yes  [] No        Odalys Cabello Rep   05/01/25 11:05 EDT

## 2025-05-01 NOTE — TELEPHONE ENCOUNTER
Caller: WES Dominguez RN [facility]    Relationship:     Best call back number:  [phone]     What is the medical concern/diagnosis: PODIATRY     What specialty or service is being requested: PODIATRY     What is the provider, practice or medical service name: [facility]    What is the office location: [address] [place] [address]    What is the office phone number: [phone]    Any additional details: [facility] IS CALLING REQUESTING FOR A REFERRAL TO PODIATRY FOR PATIENT TOE NAIL, DISCOLORATION, THICK, LONG ETC.

## 2025-05-28 ENCOUNTER — ANTICOAGULATION VISIT (OUTPATIENT)
Dept: FAMILY MEDICINE CLINIC | Age: OVER 89
End: 2025-05-28
Payer: MEDICARE

## 2025-05-28 VITALS — HEART RATE: 87 BPM | DIASTOLIC BLOOD PRESSURE: 63 MMHG | SYSTOLIC BLOOD PRESSURE: 118 MMHG

## 2025-05-28 DIAGNOSIS — Z86.73 HISTORY OF STROKE: Primary | ICD-10-CM

## 2025-05-28 LAB — INR PPP: 1.4 (ref 2–3)

## 2025-06-02 ENCOUNTER — OFFICE VISIT (OUTPATIENT)
Dept: FAMILY MEDICINE CLINIC | Age: OVER 89
End: 2025-06-02
Payer: MEDICARE

## 2025-06-02 VITALS
BODY MASS INDEX: 36.22 KG/M2 | OXYGEN SATURATION: 95 % | HEART RATE: 75 BPM | HEIGHT: 62 IN | TEMPERATURE: 98.3 F | SYSTOLIC BLOOD PRESSURE: 120 MMHG | WEIGHT: 196.8 LBS | DIASTOLIC BLOOD PRESSURE: 77 MMHG

## 2025-06-02 DIAGNOSIS — N18.32 STAGE 3B CHRONIC KIDNEY DISEASE: ICD-10-CM

## 2025-06-02 DIAGNOSIS — E78.00 HIGH CHOLESTEROL: Primary | ICD-10-CM

## 2025-06-02 DIAGNOSIS — M17.12 PRIMARY OSTEOARTHRITIS OF LEFT KNEE: ICD-10-CM

## 2025-06-02 DIAGNOSIS — Z87.19 HISTORY OF GASTROESOPHAGEAL REFLUX (GERD): ICD-10-CM

## 2025-06-02 PROBLEM — M71.332: Status: ACTIVE | Noted: 2025-06-02

## 2025-06-02 RX ORDER — OMEGA-3S/DHA/EPA/FISH OIL/D3 300MG-1000
400 CAPSULE ORAL DAILY
COMMUNITY

## 2025-06-02 NOTE — PROGRESS NOTES
Chief Complaint  Hyperlipidemia (6 months)    Subjective          Ping Moore presents to [facility]  History of Present Illness  --LAST LIPIDS WERE OK, NO ISSUES WITH THE STATIN  --HAD SOME GERD IN THE PAST BUT IS DOING WELL CURRENTLY WITHOUT MEDS  --LAST GFR WAS DOWN TO 39  --OA OF LEFT KNEE S/P A TKR BUT HAS HAD PAIN AND SWELLING OFF AND ON SINCE.  X-RAYS WERE OK.  IMPROVED WITH EXERCISES SHE WAS GIVEN BY P.T. BUT HAS NOT BEEN DOING THEM RECENTLY         Allergies   Allergen Reactions    Latex Rash    Naproxen Rash     and nausea    Lisinopril Unknown - Low Severity        Health Maintenance Due   Topic Date Due    DXA SCAN  Never done    Pneumococcal Vaccine 50+ (1 of 1 - PCV) Never done    ZOSTER VACCINE (1 of 2) Never done    ANNUAL WELLNESS VISIT  06/23/2021    COVID-19 Vaccine (3 - 2024-25 season) 09/01/2024        Current Outpatient Medications on File Prior to Visit   Medication Sig    atorvastatin (LIPITOR) 80 MG tablet Take 1 tablet by mouth every night at bedtime.    Cholecalciferol 10 MCG (400 UNIT) tablet Take 1 tablet by mouth Daily.    furosemide (Lasix) 20 MG tablet Take 1 tablet by mouth Daily.    sertraline (Zoloft) 50 MG tablet Take 1 tablet by mouth Daily.    spironolactone (Aldactone) 25 MG tablet Take 1 tablet by mouth Daily.    warfarin (COUMADIN) 2 MG tablet Take 1 tablet by mouth Every Night.     No current facility-administered medications on file prior to visit.       Immunization History   Administered Date(s) Administered    COVID-19 (MODERNA) 1st,2nd,3rd Dose Monovalent 02/24/2021, 03/25/2021    Fluzone High-Dose 65+YRS 01/08/2018       Review of Systems   Constitutional:  Negative for activity change, appetite change, chills, fatigue and fever.   HENT:  Negative for congestion, ear pain, rhinorrhea and sore throat.    Respiratory:  Negative for cough and shortness of breath.    Cardiovascular:  Negative for chest pain, palpitations and leg  "swelling.   Gastrointestinal:  Negative for abdominal pain, constipation, diarrhea, nausea and vomiting.   Musculoskeletal:  Positive for arthralgias. Negative for myalgias.   Neurological:  Negative for headache.        Objective     /77 (BP Location: Left arm, Patient Position: Sitting)   Pulse 75   Temp 98.3 °F (36.8 °C) (Oral)   Ht 157.5 cm (62\")   Wt 89.3 kg (196 lb 12.8 oz)   SpO2 95% Comment: on RA  BMI 36.00 kg/m²       Physical Exam  Vitals and nursing note reviewed.   Constitutional:       General: She is not in acute distress.     Appearance: Normal appearance.   Cardiovascular:      Rate and Rhythm: Normal rate and regular rhythm.      Heart sounds: Normal heart sounds. No murmur heard.  Pulmonary:      Effort: Pulmonary effort is normal.      Breath sounds: Normal breath sounds.   Abdominal:      Palpations: Abdomen is soft.      Tenderness: There is no abdominal tenderness.   Musculoskeletal:      Cervical back: Neck supple.      Right lower leg: No edema.      Left lower leg: No edema.      Comments: PAIN WITH PASSIVE ROM OF THE LEFT KNEE    Lymphadenopathy:      Cervical: No cervical adenopathy.   Neurological:      General: No focal deficit present.      Mental Status: She is alert.      Cranial Nerves: No cranial nerve deficit.      Coordination: Coordination normal.      Gait: Gait normal.   Psychiatric:         Mood and Affect: Mood normal.         Behavior: Behavior normal.         Result Review :                             Assessment and Plan      Diagnoses and all orders for this visit:    1. High cholesterol (Primary)  Assessment & Plan:   Lipid abnormalities are stable    Plan:  Continue same medication/s without change.      Discussed medication dosage, use, side effects, and goals of treatment in detail.    Counseled patient on lifestyle modifications to help control hyperlipidemia.     Patient Treatment Goals:   LDL goal is less than 70    Followup in 6 months.      2. " History of gastroesophageal reflux (GERD)  Assessment & Plan:  OBSERVE AS IMPROVED       3. Stage 3b chronic kidney disease  Assessment & Plan:  Renal condition is stable.  Continue current treatment regimen.  Renal condition will be reassessed in 6 months.      4. Primary osteoarthritis of left knee (s/p a TKR)  Assessment & Plan:  ENCOURAGED TO DO THE EXERCISES SHE WAS PROVIDED BY [facility] AND TO BE CAREFUL OF FALLING                       Follow Up     Return in about 6 months (around 12/2/2025).    Patient was given instructions and counseling regarding her condition or for health maintenance advice. Please see specific information pulled into the AVS if appropriate.

## 2025-06-24 ENCOUNTER — OFFICE VISIT (OUTPATIENT)
Dept: PODIATRY | Facility: CLINIC | Age: OVER 89
End: 2025-06-24
Payer: MEDICARE

## 2025-06-24 VITALS
HEART RATE: 73 BPM | HEIGHT: 62 IN | BODY MASS INDEX: 36.44 KG/M2 | SYSTOLIC BLOOD PRESSURE: 118 MMHG | WEIGHT: 198 LBS | OXYGEN SATURATION: 94 % | DIASTOLIC BLOOD PRESSURE: 72 MMHG

## 2025-06-24 DIAGNOSIS — M79.672 FOOT PAIN, BILATERAL: ICD-10-CM

## 2025-06-24 DIAGNOSIS — B35.1 ONYCHOMYCOSIS: ICD-10-CM

## 2025-06-24 DIAGNOSIS — L60.0 ONYCHOCRYPTOSIS: Primary | ICD-10-CM

## 2025-06-24 DIAGNOSIS — M79.671 FOOT PAIN, BILATERAL: ICD-10-CM

## 2025-06-24 PROCEDURE — 1160F RVW MEDS BY RX/DR IN RCRD: CPT | Performed by: PODIATRIST

## 2025-06-24 PROCEDURE — 1159F MED LIST DOCD IN RCRD: CPT | Performed by: PODIATRIST

## 2025-06-24 PROCEDURE — 11721 DEBRIDE NAIL 6 OR MORE: CPT | Performed by: PODIATRIST

## 2025-06-24 NOTE — PROGRESS NOTES
[facility] - PODIATRY    Today's Date: 06/24/25    Patient Name: Ping Moore  MRN: [id number]  CSN: [id number]  PCP: Ricci Means MD, Last PCP Visit: 2 June 2025  Referring Provider: Ricci Means*    SUBJECTIVE     Chief Complaint   Patient presents with    Left Foot - Establish Care, Nail Problem     Thick, discolored     Right Foot - Establish Care, Nail Problem     HPI: Ping Moore, a 89 y.o.female, comes to clinic:    New, Established, New Problem:  New  Location:  Toenails  Duration:   Greater than one year  Onset:  Gradual  Nature:  sore with palpation.  Aggravating factors:  Pain with shoe gear and ambulation.    No other pedal complaints at this time.    Patient denies any fevers, chills, nausea, vomiting, shortness of breath, nor any other constitutional signs nor symptoms.       Past Medical History:   Diagnosis Date    Allergic conjunctivitis     Arthritis     COPD (chronic obstructive pulmonary disease)     Current use of long term anticoagulation     Dysthymic disorder     GERD (gastroesophageal reflux disease)     Localized edema     Pure hypercholesterolemia     Stroke 2016    Thrombus of aortic arch     Urinary incontinence      Past Surgical History:   Procedure Laterality Date    CATARACT EXTRACTION, BILATERAL      COLOSTOMY      WITH REVERSAL    HYSTERECTOMY      REPLACEMENT TOTAL KNEE Left      History reviewed. No pertinent family history.  Social History     Socioeconomic History    Marital status:    Tobacco Use    Smoking status: Never    Smokeless tobacco: Never   Vaping Use    Vaping status: Never Used   Substance and Sexual Activity    Alcohol use: Never    Drug use: Never    Sexual activity: Defer     Allergies   Allergen Reactions    Latex Rash    Naproxen Rash     and nausea    Lisinopril Unknown - Low Severity     Current Outpatient Medications   Medication Sig Dispense Refill    atorvastatin (LIPITOR) 80 MG tablet Take 1  tablet by mouth every night at bedtime. 90 tablet 1    Cholecalciferol 10 MCG (400 UNIT) tablet Take 1 tablet by mouth Daily.      furosemide (Lasix) 20 MG tablet Take 1 tablet by mouth Daily. 90 tablet 1    sertraline (Zoloft) 50 MG tablet Take 1 tablet by mouth Daily. 90 tablet 1    spironolactone (Aldactone) 25 MG tablet Take 1 tablet by mouth Daily. 90 tablet 1    warfarin (COUMADIN) 2 MG tablet Take 1 tablet by mouth Every Night. 90 tablet 1     No current facility-administered medications for this visit.     Review of Systems    OBJECTIVE     Vitals:    25 0852   BP: 118/72   Pulse: 73   SpO2: 94%       Patient seen in no apparent distress.      PHYSICAL EXAM:     Foot/Ankle Exam    GENERAL  Appearance:  appears stated age, elderly and healthy  Orientation:  AAOx3  Affect:  appropriate  Gait:  unimpaired  Assistance:  independent  Right shoe gear: sandal  Left shoe gear: sandal    VASCULAR     Right Foot Vascularity   Dorsalis pedis:  1+  Posterior tibial:  1+  Skin temperature:  warm  Edema gradin+ and pitting  CFT:  < 3 seconds  Pedal hair growth:  Absent  Varicosities:  moderate varicosities     Left Foot Vascularity   Dorsalis pedis:  1+  Posterior tibial:  1+  Skin temperature:  warm  Edema gradin+ and pitting  CFT:  < 3 seconds  Pedal hair growth:  Absent  Varicosities:  moderate varicosities     NEUROLOGIC     Right Foot Neurologic   Normal sensation    Light touch sensation: normal  Vibratory sensation: normal  Hot/Cold sensation: normal  Protective Sensation using [place] Monofilament:   Sites intact: 10  Sites tested: 10     Left Foot Neurologic   Normal sensation    Light touch sensation: normal  Vibratory sensation: normal  Hot/Cold sensation:  normal  Protective Sensation using [place] Monofilament:   Sites intact: 10  Sites tested: 10    MUSCLE STRENGTH     Right Foot Muscle Strength   Foot dorsiflexion:  4  Foot plantar flexion:  4  Foot inversion:  4  Foot  eversion:  4     Left Foot Muscle Strength   Foot dorsiflexion:  4  Foot plantar flexion:  4  Foot inversion:  4  Foot eversion:  4    RANGE OF MOTION     Right Foot Range of Motion   Foot and ankle ROM within normal limits       Left Foot Range of Motion   Foot and ankle ROM within normal limits      DERMATOLOGIC      Right Foot Dermatologic   Skin  Right foot skin is intact.   Nails  1.  Positive for elongated, onychomycosis, abnormal thickness, subungual debris and ingrown toenail.  2.  Positive for elongated, onychomycosis, abnormal thickness, subungual debris and ingrown toenail.  3.  Positive for elongated, onychomycosis, abnormal thickness, subungual debris and ingrown toenail.  4.  Positive for elongated, onychomycosis, abnormal thickness, subungual debris and ingrown toenail.  5.  Normal. Positive for elongated, onychomycosis, abnormal thickness, subungual debris and ingrown toenail.  Nails comment:  Toenails 1, 2, 3, 4, and 5     Left Foot Dermatologic   Skin  Left foot skin is intact.   Nails comment:  Toenails 1, 2, 3, 4, and 5  Nails  1.  Positive for elongated, onychomycosis, abnormal thickness, subungual debris and ingrown toenail.  2.  Positive for elongated, onychomycosis, abnormal thickness, subungual debris and ingrown toenail.  3.  Positive for elongated, onychomycosis, abnormal thickness, subungual debris and ingrown toenail.      ASSESSMENT/PLAN     Diagnoses and all orders for this visit:    1. Onychocryptosis (Primary)    2. Onychomycosis    3. Foot pain, bilateral        Comprehensive lower extremity examination and evaluation was performed.    Discussed findings and treatment plan including risks, benefits, and treatment options with patient in detail. Patient agreed with treatment plan.    Toenails 1, 2, 3, 4, 5 on Right and 1, 2, 3 on Left were debrided with nail nippers then filed with a Mary nail chuy.  Patient tolerated procedure well without complications.    An After Visit  Summary was printed and given to the patient at discharge, including (if requested) any available informative/educational handouts regarding diagnosis, treatment, or medications. All questions were answered to patient/family satisfaction. Should symptoms fail to improve or worsen they agree to call or return to clinic or to go to the Emergency Department. Discussed the importance of following up with any needed screening tests/labs/specialist appointments and any requested follow-up recommended by me today. Importance of maintaining follow-up discussed and patient accepts that missed appointments can delay diagnosis and potentially lead to worsening of conditions.    Return in about 9 weeks (around 8/26/2025) for Toenail Care., or sooner if acute issues arise.    This document has been electronically signed by Richard Carmen DPM on June 24, 2025 09:16 EDT

## 2025-07-02 ENCOUNTER — ANTICOAGULATION VISIT (OUTPATIENT)
Dept: FAMILY MEDICINE CLINIC | Age: OVER 89
End: 2025-07-02
Payer: MEDICARE

## 2025-07-02 VITALS — DIASTOLIC BLOOD PRESSURE: 62 MMHG | SYSTOLIC BLOOD PRESSURE: 124 MMHG | HEART RATE: 70 BPM

## 2025-07-02 DIAGNOSIS — Z86.73 HISTORY OF STROKE: Primary | ICD-10-CM

## 2025-07-02 LAB — INR PPP: 2.5 (ref 2–3)

## 2025-07-02 PROCEDURE — 93793 ANTICOAG MGMT PT WARFARIN: CPT | Performed by: FAMILY MEDICINE

## 2025-07-02 PROCEDURE — 85610 PROTHROMBIN TIME: CPT | Performed by: FAMILY MEDICINE

## 2025-07-02 PROCEDURE — 36416 COLLJ CAPILLARY BLOOD SPEC: CPT | Performed by: FAMILY MEDICINE

## 2025-07-28 ENCOUNTER — TELEPHONE (OUTPATIENT)
Dept: FAMILY MEDICINE CLINIC | Age: OVER 89
End: 2025-07-28
Payer: MEDICARE

## 2025-07-28 NOTE — TELEPHONE ENCOUNTER
Pt came in for overdue INR but has a HMO and pt family did not know that it had been changed, per , pt son spoke to insurance today and it will be changed back to PPO on Aug 1, pt rescheduled for that day

## 2025-08-01 ENCOUNTER — ANTICOAGULATION VISIT (OUTPATIENT)
Dept: FAMILY MEDICINE CLINIC | Age: OVER 89
End: 2025-08-01
Payer: MEDICARE

## 2025-08-01 DIAGNOSIS — Z86.73 HISTORY OF STROKE: Primary | ICD-10-CM

## 2025-08-01 LAB — INR PPP: 1.9 (ref 2–3)

## 2025-08-01 PROCEDURE — 36416 COLLJ CAPILLARY BLOOD SPEC: CPT | Performed by: FAMILY MEDICINE

## 2025-08-01 PROCEDURE — 85610 PROTHROMBIN TIME: CPT | Performed by: FAMILY MEDICINE

## 2025-08-01 PROCEDURE — 93793 ANTICOAG MGMT PT WARFARIN: CPT | Performed by: FAMILY MEDICINE

## 2025-08-26 DIAGNOSIS — F34.1 DYSTHYMIC DISORDER: ICD-10-CM

## 2025-08-26 DIAGNOSIS — R60.0 LOCALIZED EDEMA: ICD-10-CM

## 2025-08-26 RX ORDER — SPIRONOLACTONE 25 MG/1
25 TABLET ORAL DAILY
Qty: 30 TABLET | Refills: 0 | Status: SHIPPED | OUTPATIENT
Start: 2025-08-26

## 2025-08-26 RX ORDER — FUROSEMIDE 20 MG/1
20 TABLET ORAL DAILY
Qty: 30 TABLET | Refills: 0 | Status: SHIPPED | OUTPATIENT
Start: 2025-08-26